# Patient Record
Sex: FEMALE | Race: WHITE | Employment: FULL TIME | URBAN - METROPOLITAN AREA
[De-identification: names, ages, dates, MRNs, and addresses within clinical notes are randomized per-mention and may not be internally consistent; named-entity substitution may affect disease eponyms.]

---

## 2017-01-30 ENCOUNTER — TELEPHONE (OUTPATIENT)
Dept: FAMILY MEDICINE | Facility: OTHER | Age: 27
End: 2017-01-30

## 2017-01-30 NOTE — TELEPHONE ENCOUNTER
Summary:    Patient is due/failing the following:   PAP, PHQ9 and PHYSICAL    Action needed:   Patient needs office visit for physical . and Patient needs to do PHQ9.    Type of outreach:    Phone, left message for patient to call back.     Questions for provider review:    None                                                                                                                                    Rekha Mac       Chart routed to Care Team .        Panel Management Review      Patient has the following on her problem list:     Depression / Dysthymia review  PHQ-9 SCORE 12/30/2015 3/14/2016 5/31/2016   Total Score - - -   Total Score 13 9 10      Patient is due for:  PHQ9      Composite cancer screening  Chart review shows that this patient is due/due soon for the following Pap Smear

## 2017-01-30 NOTE — Clinical Note
[facility]  [address] [facility] [address]  Phone: [phone]  February 1, 2017      Maryann Villarreal  [address] [place] [id number]      Dear Maryann,    We care about your health and have reviewed your health plan including your medical conditions, medications, and lab results.  Based on this review, it is recommended that you follow up regarding the following health topic(s):  -Wellness (Physical) Visit     We recommend you take the following action(s):  -Schedule a Physical appointment      Please call us at the [facility] - [phone] (or use TuneCore) to address the above recommendations.     Thank you for trusting [facility] and we appreciate the opportunity to serve you.  We look forward to supporting your healthcare needs in the future.    Healthy Regards,    Your Health Care Team  [facility]

## 2017-04-04 ENCOUNTER — OFFICE VISIT (OUTPATIENT)
Dept: URGENT CARE | Facility: RETAIL CLINIC | Age: 27
End: 2017-04-04
Payer: COMMERCIAL

## 2017-04-04 VITALS — DIASTOLIC BLOOD PRESSURE: 75 MMHG | SYSTOLIC BLOOD PRESSURE: 124 MMHG | TEMPERATURE: 97.7 F | HEART RATE: 83 BPM

## 2017-04-04 DIAGNOSIS — R30.0 DYSURIA: Primary | ICD-10-CM

## 2017-04-04 LAB
APPEARANCE UR: CLEAR
BILIRUB UR QL: NORMAL
COLOR UR: YELLOW
GLUCOSE URINE: NORMAL MG/DL
HGB UR QL: NORMAL
KETONES UR QL: NORMAL MG/DL
LEUKOCYTE ESTERASE URINE: NORMAL
NITRITE UR QL STRIP: NORMAL
PH UR STRIP: 6.5 PH (ref 5–7)
PROTEIN ALBUMIN URINE: NORMAL MG/DL
SOURCE: NORMAL
SP GR UR STRIP: 1.01 (ref 1–1.03)
UROBILINOGEN UR QL STRIP: 0.2 EU/DL (ref 0.2–1)

## 2017-04-04 PROCEDURE — 99202 OFFICE O/P NEW SF 15 MIN: CPT | Performed by: PHYSICIAN ASSISTANT

## 2017-04-04 PROCEDURE — 87086 URINE CULTURE/COLONY COUNT: CPT | Performed by: PHYSICIAN ASSISTANT

## 2017-04-04 PROCEDURE — 81002 URINALYSIS NONAUTO W/O SCOPE: CPT | Mod: QW | Performed by: PHYSICIAN ASSISTANT

## 2017-04-04 NOTE — NURSING NOTE
"Chief Complaint   Patient presents with     Dysuria     since sunday, feeling like having to urinate all the time, no fevers       Initial /75 (BP Location: Left arm)  Pulse 83  Temp 97.7  F (36.5  C) (Temporal) Estimated body mass index is 30.72 kg/(m^2) as calculated from the following:    Height as of 2/24/16: 5' 8.31\" (1.735 m).    Weight as of 5/31/16: 203 lb 14.4 oz (92.5 kg).  Medication Reconciliation: complete    "

## 2017-04-04 NOTE — PROGRESS NOTES
Chief Complaint   Patient presents with     Dysuria     since sunday, feeling like having to urinate all the time, no fevers     SUBJECTIVE:   Maryann Villarreal is a 26 year old female who  presents today for a possible UTI.   She has symptoms of urgency and frequency that have been going on for 2 days.    Symptom timing described as gradual onset and mild and moderate in severity.    This patient does have a history of 1 past urinary tract infections May 2016 treated with Cipro.  There is no history of hematuria, flank pain, fever, chills, nausea or vomiting.   Patient denies vaginal discharge, vaginal odor, vaginal itching and dyspareunia (pain in labia/pelvis)     Past Medical History:   Diagnosis Date     Restless legs syndrome (RLS)      Sprain and strain of other specified sites of knee and leg     shin splints     Current Outpatient Prescriptions   Medication Sig Dispense Refill     Phenylephrine-DM-GG-APAP (COLD & FLU SEVERE PO)        TYLENOL 325 MG OR TABS  (Patient not taking: Reported on 4/4/2017)  0     IBUPROFEN 200 MG OR CAPS  (Patient not taking: Reported on 4/4/2017)  0     Social History   Substance Use Topics     Smoking status: Never Smoker     Smokeless tobacco: Never Used     Alcohol use Yes      Comment: 3-4 times/year     Allergies   Allergen Reactions     No Known Drug Allergies      ROS:   Review of systems negative except as stated above.    OBJECTIVE:  /75 (BP Location: Left arm)  Pulse 83  Temp 97.7  F (36.5  C) (Temporal)  LMP 03/11/2017 (Exact Date)  GENERAL APPEARANCE: healthy, alert and no distress  RESP: lungs clear to auscultation - no rales, rhonchi or wheezes  CV: regular rates and rhythm, normal S1 S2, no murmur noted  ABDOMEN:  soft, no suprapubic tenderness, no HSM or masses and bowel sounds normal  BACK: No CVA tenderness    UA:  Leukocytes: negative  Nitrites: negative  Blood: negative    ASSESSMENT:    ICD-10-CM    1. Dysuria R30.0 Urine Culture Aerobic Bacterial      HCL U/A, W/O MICRO, NON AUTO     PLAN:   Patient Instructions   No sign of infection today, antibiotic is not needed.  Urine culture pending, we will call you only if culture shows bacterial growth and an antibiotic is needed.  May use over the counter Azo Pain Relief or Uristat for urinary burning but do not use for 24 hours before future urine tests.  Drink plenty of fluids. Limit caffeine and alcohol as these are bladder irritants.  May take tylenol or ibuprofen as needed for discomfort.   If you develop any vomiting, high fevers or lower back pain, these can be signs of a kidney infection and you should be seen in urgent care or in the ER.  Please follow up with primary care provider if you're not improving, symptoms are worsening or new symptoms develop.     Follow up with primary care provider with any problems, questions or concerns or if symptoms worsen or fail to improve. Patient agreed to plan and verbalized understanding.    Zulma Luna PA-C  [facility]

## 2017-04-04 NOTE — PATIENT INSTRUCTIONS
No sign of infection today, antibiotic is not needed.  Urine culture pending, we will call you only if culture shows bacterial growth and an antibiotic is needed.  May use over the counter Azo Pain Relief or Uristat for urinary burning but do not use for 24 hours before future urine tests.  Drink plenty of fluids. Limit caffeine and alcohol as these are bladder irritants.  May take tylenol or ibuprofen as needed for discomfort.   If you develop any vomiting, high fevers or lower back pain, these can be signs of a kidney infection and you should be seen in urgent care or in the ER.  Please follow up with primary care provider if you're not improving, symptoms are worsening or new symptoms develop.

## 2017-04-04 NOTE — MR AVS SNAPSHOT
After Visit Summary   4/4/2017    Maryann Villarreal    MRN: [id number]           Patient Information     Date Of Birth          1990        Visit Information        Provider Department      4/4/2017 10:40 AM Taylor Luna PA-C [facility]        Today's Diagnoses     Dysuria    -  1      Care Instructions    No sign of infection today, antibiotic is not needed.  Urine culture pending, we will call you only if culture shows bacterial growth and an antibiotic is needed.  May use over the counter Azo Pain Relief or Uristat for urinary burning but do not use for 24 hours before future urine tests.  Drink plenty of fluids. Limit caffeine and alcohol as these are bladder irritants.  May take tylenol or ibuprofen as needed for discomfort.   If you develop any vomiting, high fevers or lower back pain, these can be signs of a kidney infection and you should be seen in urgent care or in the ER.  Please follow up with primary care provider if you're not improving, symptoms are worsening or new symptoms develop.         Follow-ups after your visit        Who to contact     You can reach your care team any time of the day by calling [phone].  Notification of test results:  If you have an abnormal lab result, we will notify you by phone as soon as possible.         Additional Information About Your Visit        MyChart Information     [employer] gives you secure access to your electronic health record. If you see a primary care provider, you can also send messages to your care team and make appointments. If you have questions, please call your primary care clinic.  If you do not have a primary care provider, please call [phone] and they will assist you.        Care EveryWhere ID     This is your Care EveryWhere ID. This could be used by other organizations to access your [facility] medical records  [id number]        Your Vitals Were     Pulse Temperature Last Period             83  97.7  F (36.5  C) (Temporal) 03/11/2017 (Exact Date)          Blood Pressure from Last 3 Encounters:   04/04/17 124/75   05/31/16 108/64   03/14/16 120/64    Weight from Last 3 Encounters:   05/31/16 203 lb 14.4 oz (92.5 kg)   03/14/16 201 lb (91.2 kg)   02/24/16 197 lb 6.4 oz (89.5 kg)              We Performed the Following     HCL U/A, W/O MICRO, NON AUTO     Urine Culture Aerobic Bacterial        Primary Care Provider Office Phone # Fax #    Arleth Negro PA-C [phone]       [facility] [address] [place] [address]        Thank you!     Thank you for choosing [facility]  for your care. Our goal is always to provide you with excellent care. Hearing back from our patients is one way we can continue to improve our services. Please take a few minutes to complete the written survey that you may receive in the mail after your visit with us. Thank you!             Your Updated Medication List - Protect others around you: Learn how to safely use, store and throw away your medicines at www.disposemymeds.org.          This list is accurate as of: 4/4/17 11:09 AM.  Always use your most recent med list.                   Brand Name Dispense Instructions for use    COLD & FLU SEVERE PO          ibuprofen 200 MG capsule          TYLENOL 325 MG tablet   Generic drug:  acetaminophen

## 2017-04-05 NOTE — PROGRESS NOTES
"UC prelim \"Culture negative < 24 hours\"  No sign of bacteria  Not on antibiotic. Urine dip at visit was negative. No change in plan.  Await final  results  Judie Mccurdy PA-C  [facility]"

## 2017-04-06 LAB
BACTERIA SPEC CULT: NORMAL
MICRO REPORT STATUS: NORMAL
SPECIMEN SOURCE: NORMAL

## 2017-04-06 NOTE — PROGRESS NOTES
Urine culture remains negative with <10,000 mixed gram positive growth, likely contamination. Patient does not require antibiotic treatment at this time. No change needed. -Zulma Luna PA-C

## 2017-05-26 ENCOUNTER — HEALTH MAINTENANCE LETTER (OUTPATIENT)
Age: 27
End: 2017-05-26

## 2017-08-07 ENCOUNTER — RADIANT APPOINTMENT (OUTPATIENT)
Dept: GENERAL RADIOLOGY | Facility: OTHER | Age: 27
End: 2017-08-07
Attending: PHYSICAL MEDICINE & REHABILITATION

## 2017-08-07 ENCOUNTER — OFFICE VISIT (OUTPATIENT)
Dept: ORTHOPEDICS | Facility: OTHER | Age: 27
End: 2017-08-07

## 2017-08-07 ENCOUNTER — TELEPHONE (OUTPATIENT)
Dept: FAMILY MEDICINE | Facility: OTHER | Age: 27
End: 2017-08-07

## 2017-08-07 VITALS
DIASTOLIC BLOOD PRESSURE: 78 MMHG | WEIGHT: 210 LBS | SYSTOLIC BLOOD PRESSURE: 132 MMHG | HEIGHT: 69 IN | BODY MASS INDEX: 31.1 KG/M2

## 2017-08-07 DIAGNOSIS — S90.32XA CONTUSION OF LEFT FOOT, INITIAL ENCOUNTER: ICD-10-CM

## 2017-08-07 DIAGNOSIS — S93.602A SPRAIN OF LEFT FOOT, INITIAL ENCOUNTER: Primary | ICD-10-CM

## 2017-08-07 DIAGNOSIS — M79.672 LEFT FOOT PAIN: ICD-10-CM

## 2017-08-07 PROCEDURE — 73630 X-RAY EXAM OF FOOT: CPT | Mod: LT

## 2017-08-07 PROCEDURE — 99203 OFFICE O/P NEW LOW 30 MIN: CPT | Performed by: PHYSICAL MEDICINE & REHABILITATION

## 2017-08-07 NOTE — LETTER
Maryann Villarreal  : 1990  [address] [place] [address]  [phone] (home)             2017          To whom it may concern,      Maryann was seen in clinic today for a foot injury. She is able to return to work with the following restrictions:    -Allow 15 min breaks every 2 hours      She will be seen in follow up in 2 weeks for reevaluation. Thank you for your help in advance.             Sincerely,           Magda Abernathy MD

## 2017-08-07 NOTE — MR AVS SNAPSHOT
After Visit Summary   8/7/2017    Maryann Villarreal    MRN: [id number]           Patient Information     Date Of Birth          1990        Visit Information        Provider Department      8/7/2017 2:40 PM Magda Abernathy MD [facility]        Today's Diagnoses     Left foot pain    -  1      Care Instructions    Today's Plan of Care:  -Ibuprofen (Advil) 800mg every 8 hours for 7 days. Take with food. Following 7 day course, take as needed for pain.  -Ice 15-20 minutes for pain relief or swelling as needed  -Wear orthotics  -Wear supportive shoes  -Work restrictions: Allow 15 min breaks every 2 hours    Follow Up: 2 weeks or sooner if symptoms fail to improve or worsen. Call with any questions or concerns.               Follow-ups after your visit        Who to contact     If you have questions or need follow up information about today's clinic visit or your schedule please contact [facility] directly at [phone].  Normal or non-critical lab and imaging results will be communicated to you by [employer], letter or phone within 4 business days after the clinic has received the results. If you do not hear from us within 7 days, please contact the clinic through [employer] or phone. If you have a critical or abnormal lab result, we will notify you by phone as soon as possible.  Submit refill requests through [employer] or call your pharmacy and they will forward the refill request to us. Please allow 3 business days for your refill to be completed.          Additional Information About Your Visit        [employer] Information     [employer] gives you secure access to your electronic health record. If you see a primary care provider, you can also send messages to your care team and make appointments. If you have questions, please call your primary care clinic.  If you do not have a primary care provider, please call [phone] and they will assist you.        Care  "EveryWhere ID     This is your Care EveryWhere ID. This could be used by other organizations to access your [facility] medical records  [id number]        Your Vitals Were     Height                   5' 8.9\" (1.75 m)            Blood Pressure from Last 3 Encounters:   08/07/17 132/78   04/04/17 124/75   05/31/16 108/64    Weight from Last 3 Encounters:   05/31/16 203 lb 14.4 oz (92.5 kg)   03/14/16 201 lb (91.2 kg)   02/24/16 197 lb 6.4 oz (89.5 kg)               Primary Care Provider Office Phone # Fax #    Arleth Negro PA-C [phone]       [place] [facility] [address] [place] [address]        Equal Access to Services     [facility]: Hadii aad juany hadasho Soomaali, waaxda luqadaha, qaybta kaalmada adeegyada, waxay tammyin haylindsey [place] . So [facility] [phone].    ATENCIÓN: Si habla español, tiene a ramírez disposición servicios gratuitos de asistencia lingüística. Mery al [phone].    We comply with applicable federal civil rights laws and [place] laws. We do not discriminate on the basis of race, color, national origin, age, disability sex, sexual orientation or gender identity.            Thank you!     Thank you for choosing [facility]  for your care. Our goal is always to provide you with excellent care. Hearing back from our patients is one way we can continue to improve our services. Please take a few minutes to complete the written survey that you may receive in the mail after your visit with us. Thank you!             Your Updated Medication List - Protect others around you: Learn how to safely use, store and throw away your medicines at www.disposemymeds.org.          This list is accurate as of: 8/7/17  3:25 PM.  Always use your most recent med list.                   Brand Name Dispense Instructions for use Diagnosis    COLD & FLU SEVERE PO           ibuprofen 200 MG capsule           TYLENOL 325 MG tablet   Generic drug:  acetaminophen "

## 2017-08-07 NOTE — PATIENT INSTRUCTIONS
Today's Plan of Care:  -Ibuprofen (Advil) 800mg every 8 hours for 7 days. Take with food. Following 7 day course, take as needed for pain.  -Ice 15-20 minutes for pain relief or swelling as needed  -Wear orthotics  -Wear supportive shoes  -Work restrictions: Allow 15 min breaks every 2 hours    Follow Up: 2 weeks or sooner if symptoms fail to improve or worsen. Call with any questions or concerns.

## 2017-08-07 NOTE — PROGRESS NOTES
Sports Medicine Clinic Visit    PCP: Arleth Negro    CC: Patient presents with:  Ankle/Foot left      HPI:  Maryann Villarreal is a 26 year old female who is seen as a self referral.   She notes pain that began 8/6/17 when she was playing softball over the weekend. She hurt her foot rounding a base as well as getting hit by a ball later that same day .  Pain is located on the left foot.  She rates the pain at a  10/10 at its worst and a 7/10 currently.  Symptoms are relieved with nothing and worsened by standing, activity and walking. She endorses swelling and dull ache, some tingling into the pinky toe and denies bruising, popping, grinding, catching, locking, instability, numbness, tingling, weakness, pain in other joints and fever, chills.  Other treatment has included Aleve and Tylenol. She notes difficulty the more she walks.       Review of Systems:  Musculoskeletal: as above  Remainder of review of systems is negative including constitutional, eyes, ENT, CV, pulmonary, GI, , endocrine, skin, hematologic, and neurologic except as noted in HPI or medical history.    History reviewed. No pertinent past surgical/medical/family/social history.    Past Medical History:   Diagnosis Date     Restless legs syndrome (RLS)      Sprain and strain of other specified sites of knee and leg     shin splints     No past surgical history on file.  No family history on file.  Social History     Social History     Marital status: Single     Spouse name: N/A     Number of children: N/A     Years of education: N/A     Occupational History     Not on file.     Social History Main Topics     Smoking status: Never Smoker     Smokeless tobacco: Never Used     Alcohol use Yes      Comment: 3-4 times/year     Drug use: No     Sexual activity: No      Comment: never      Other Topics Concern     Parent/Sibling W/ Cabg, Mi Or Angioplasty Before 65f 55m? No     Social History Narrative       She works doing tree trimming  At baseline,  "she does not need assistance with ambulation      Current Outpatient Prescriptions   Medication     TYLENOL 325 MG OR TABS     IBUPROFEN 200 MG OR CAPS     Phenylephrine-DM-GG-APAP (COLD & FLU SEVERE PO)     No current facility-administered medications for this visit.      Allergies   Allergen Reactions     No Known Drug Allergies          Objective:  /78  Ht 5' 8.9\" (1.75 m)    General: Alert and in no distress    Head: Normocephalic, atraumatic  Eyes: no scleral icterus or conjunctival erythema   Oropharynx:  Mucous membranes moist  Skin: no erythema, ecchymosis, petechiae, or jaundice  CV: regular rhythm by palpation, 2+ distal pulses  Resp: normal respiratory effort without conversational dyspnea   Psych: normal mood and affect    Gait: Non-antalgic, appropriate coordination and balance   Neuro: Motor strength and sensation as noted below    Musculoskeletal:    Bilateral Foot and Ankle Exam:    Inspection:       no visible ecchymosis       no visible edema or effusion    Foot inspection:       pes planus    Palpation:  Mild tenderness over left proximal 4th metatarsal and cuneiforms.  Remainder of foot and ankle is not tender.      ROM:        Full active and passive ROM, ankle dorsiflexion, plantarflexion, inversion, eversion, great toe dorsiflexion, remainder of toes, midfoot and subtalar bilaterally    Strength:       ankle dorsiflexion 5/5 bilaterally       plantarflexion 5/5 bilaterally       inversion 5/5 bilaterally       eversion 5/5 bilaterally       great toe dorsiflexion 5/5 bilaterally       great toe plantarflexion 5/5 bilaterally    Special Tests:       neg (-) anterior drawer bilaterally       neg (-) talar tilt bilaterally       neg (-)  Srinivasan test bilaterally    Mild pain with heel raise on left     Neurovascular:       2+ peripheral pulses bilaterally       sensation grossly intact    Radiology:  X-rays ordered and independent visualization of images performed and reviewed with " patient.  No acute bony pathology appreciated.  Full radiology report to follow.      Assessment:  1. Sprain of left foot, initial encounter    2. Contusion of left foot, initial encounter        Plan:  Discussed the assessment with the patient. We discussed the following treatment options: symptom treatment, activity modification/rest, imaging and rehab. Following discussion, plan:  -Ibuprofen (Advil) 800mg every 8 hours for 7 days. Take with food. Following 7 day course, take as needed for pain.  -Ice 15-20 minutes for pain relief or swelling as needed  -Wear orthotics and supportive shoes.  Do not recommend wearing flip flops or going barefoot at this time.    -Recommend abstaining from aggravating physical activities.    -Work restrictions: Allow 15 min breaks every 2 hours.  Letter provided.     -Follow Up: 2 weeks or sooner if symptoms fail to improve or worsen. Call with any questions or concerns.       Alaina Abernathy MD, CAQ  [facility]

## 2017-08-07 NOTE — TELEPHONE ENCOUNTER
Reason for call:  Same Day Appointment  Reason for Call:  Same Day Appointment, Requested Provider:  ANYONE IN ER PH ZM      PCP: Arleth Negro    Reason for visit: Hit in the left foot with Softball    Duration of symptoms: 1 day     Have you been treated for this in the past? No    Additional comments: Pt stated that her work needs an ok for her to work       Can we leave a detailed message on this number? YES    Phone number patient can be reached at: Home number on file [phone] (home)    Best Time: anytime     Call taken on 8/7/2017 at 11:31 AM by Madonna Arechiga

## 2017-08-08 ENCOUNTER — TELEPHONE (OUTPATIENT)
Dept: FAMILY MEDICINE | Facility: OTHER | Age: 27
End: 2017-08-08

## 2017-08-08 NOTE — TELEPHONE ENCOUNTER
Reason for call:  Form  Reason for Call:  Form, our goal is to have forms completed with 72 hours, however, some forms may require a visit or additional information.    Type of letter, form or note:  physical capacity form    Who is the form from?: [employer] (if other please explain)    Where did the form come from: form was faxed in    What clinic location was the form placed at?: [facility] - [phone]    Where the form was placed: [facility]    What number is listed as a contact on the form?: [phone]       Additional comments: fax number [phone]    Call taken on 8/8/2017 at 8:21 AM by Rupa Cool

## 2017-08-10 NOTE — TELEPHONE ENCOUNTER
Pt returned our call and is giving verbal consent to release medical information to [facility] [employer].

## 2017-08-10 NOTE — TELEPHONE ENCOUNTER
Need verbal confirmation from patient to release medical information to work company, [employer].     Kary Lainez M.Ed., ATR, ATC

## 2017-08-25 ENCOUNTER — OFFICE VISIT (OUTPATIENT)
Dept: ORTHOPEDICS | Facility: OTHER | Age: 27
End: 2017-08-25

## 2017-08-25 VITALS — HEIGHT: 69 IN | BODY MASS INDEX: 31.1 KG/M2 | HEART RATE: 75 BPM | WEIGHT: 210 LBS

## 2017-08-25 DIAGNOSIS — S93.602D SPRAIN OF LEFT FOOT, SUBSEQUENT ENCOUNTER: Primary | ICD-10-CM

## 2017-08-25 PROCEDURE — 99212 OFFICE O/P EST SF 10 MIN: CPT | Performed by: PHYSICAL MEDICINE & REHABILITATION

## 2017-08-25 NOTE — NURSING NOTE
"Chief Complaint   Patient presents with     Ankle/Foot left       Initial Pulse 75  Ht 5' 8.9\" (1.75 m)  Wt 210 lb (95.3 kg)  BMI 31.1 kg/m2 Estimated body mass index is 31.1 kg/(m^2) as calculated from the following:    Height as of this encounter: 5' 8.9\" (1.75 m).    Weight as of this encounter: 210 lb (95.3 kg).  Medication Reconciliation: complete     Kary Lainez M.Ed., ATR, ATC      "

## 2017-08-25 NOTE — MR AVS SNAPSHOT
"              After Visit Summary   8/25/2017    Maryann Villarreal    MRN: [id number]           Patient Information     Date Of Birth          1990        Visit Information        Provider Department      8/25/2017 10:00 AM Magda Abernathy MD [facility]        Today's Diagnoses     Sprain of left foot, subsequent encounter    -  1      Care Instructions    Follow up: as needed            Follow-ups after your visit        Who to contact     If you have questions or need follow up information about today's clinic visit or your schedule please contact [facility] directly at [phone].  Normal or non-critical lab and imaging results will be communicated to you by MyChart, letter or phone within 4 business days after the clinic has received the results. If you do not hear from us within 7 days, please contact the clinic through [facility] or phone. If you have a critical or abnormal lab result, we will notify you by phone as soon as possible.  Submit refill requests through [facility] or call your pharmacy and they will forward the refill request to us. Please allow 3 business days for your refill to be completed.          Additional Information About Your Visit        MyChart Information     [employer] gives you secure access to your electronic health record. If you see a primary care provider, you can also send messages to your care team and make appointments. If you have questions, please call your primary care clinic.  If you do not have a primary care provider, please call [phone] and they will assist you.        Care EveryWhere ID     This is your Care EveryWhere ID. This could be used by other organizations to access your [place] medical records  [id number]        Your Vitals Were     Pulse Height BMI (Body Mass Index)             75 5' 8.9\" (1.75 m) 31.1 kg/m2          Blood Pressure from Last 3 Encounters:   08/07/17 132/78   04/04/17 124/75   05/31/16 108/64    " Weight from Last 3 Encounters:   08/25/17 210 lb (95.3 kg)   08/07/17 210 lb (95.3 kg)   05/31/16 203 lb 14.4 oz (92.5 kg)              Today, you had the following     No orders found for display       Primary Care Provider Office Phone # Fax #    Arleth Negro PA-C [phone] [address] [place] [address]        Equal Access to Services     [facility]: Hadii aad ku hadasho Soomaali, waaxda luqadaha, qaybta kaalmada adeegyada, waxay idiin hayaan adeeg kharash la'aan . [facility] [phone].    ATENCIÓN: Si habla español, tiene a ramírez disposición servicios gratuitos de asistencia lingüística. [facility] [phone].    We comply with applicable federal civil rights laws and [place] laws. We do not discriminate on the basis of race, color, national origin, age, disability sex, sexual orientation or gender identity.            Thank you!     Thank you for choosing [facility]  for your care. Our goal is always to provide you with excellent care. Hearing back from our patients is one way we can continue to improve our services. Please take a few minutes to complete the written survey that you may receive in the mail after your visit with us. Thank you!             Your Updated Medication List - Protect others around you: Learn how to safely use, store and throw away your medicines at www.disposemymeds.org.          This list is accurate as of: 8/25/17 10:25 AM.  Always use your most recent med list.                   Brand Name Dispense Instructions for use Diagnosis    COLD & FLU SEVERE PO           ibuprofen 200 MG capsule           TYLENOL 325 MG tablet   Generic drug:  acetaminophen

## 2017-08-25 NOTE — PROGRESS NOTES
Sports Medicine Clinic Visit - Interim History August 25, 2017    Initial Visit Date 8/7/17  Initial Injury Date 8/6/17    PCP: Arleth Negro Cherelle is a 26 year old female who is seen in f/u up for left foot sprain. Since last visit on 8/7/17 patient has had complete relief of her foot pain. She has not had any symptoms or issues for the past 6 days.  She was able to play softball last night without issues.  She rates the pain at a  0/10 currently.  Symptoms are relieved with time and worsened by nothing at this time.     Review of Systems  Musculoskeletal: as above  Remainder of review of systems is negative including constitutional, eyes, ENT, CV, pulmonary, GI, , endocrine, skin, hematologic, and neurologic except as noted in HPI or medical history.    History reviewed. No pertinent past surgical/medical/family/social history.    Past Medical History:   Diagnosis Date     Restless legs syndrome (RLS)      Sprain and strain of other specified sites of knee and leg     shin splints     No past surgical history on file.  No family history on file.  Social History     Social History     Marital status: Single     Spouse name: N/A     Number of children: N/A     Years of education: N/A     Occupational History     Not on file.     Social History Main Topics     Smoking status: Never Smoker     Smokeless tobacco: Never Used     Alcohol use Yes      Comment: 3-4 times/year     Drug use: No     Sexual activity: No      Comment: never      Other Topics Concern     Parent/Sibling W/ Cabg, Mi Or Angioplasty Before 65f 55m? No     Social History Narrative       She works doing tree trimming  At baseline, she does not need assistance with ambulation    Current Outpatient Prescriptions   Medication     Phenylephrine-DM-GG-APAP (COLD & FLU SEVERE PO)     TYLENOL 325 MG OR TABS     IBUPROFEN 200 MG OR CAPS     No current facility-administered medications for this visit.      Allergies   Allergen Reactions     No  "Known Drug Allergies          Objective:  Pulse 75  Ht 5' 8.9\" (1.75 m)  Wt 210 lb (95.3 kg)  BMI 31.1 kg/m2    General: Alert and in no distress    Head: Normocephalic, atraumatic  Eyes: no scleral icterus or conjunctival erythema   Oropharynx:  Mucous membranes moist  Skin: no erythema, ecchymosis, petechiae, or jaundice  CV: regular rhythm by palpation, 2+ distal pulses  Resp: normal respiratory effort without conversational dyspnea   Psych: normal mood and affect    Gait: Non-antalgic, appropriate coordination and balance   Neuro: Motor strength and sensation as noted below    Musculoskeletal:    Bilateral Foot and Ankle Exam:    Inspection:       no visible ecchymosis       no visible edema or effusion    Palpation:  No tenderness to palpation over the bilateral feet or ankles    ROM:        Full active and passive ROM, ankle dorsiflexion, plantarflexion, inversion, eversion, great toe dorsiflexion, remainder of toes, midfoot and subtalar bilaterally    Strength:       ankle dorsiflexion 5/5 bilaterally       plantarflexion 5/5 bilaterally       inversion 5/5 bilaterally       eversion 5/5 bilaterally       great toe dorsiflexion 5/5 bilaterally       great toe plantarflexion 5/5 bilaterally    Special Tests:       neg (-) anterior drawer bilaterally       neg (-) talar tilt bilaterally    No pain in the feet with single hop test    Gait:       normal    Neurovascular:       2+ peripheral pulses bilaterally       sensation grossly intact      Radiology:  Recent Results (from the past 744 hour(s))   XR Foot Left G/E 3 Views    Narrative    LEFT FOOT THREE OR MORE VIEWS   8/7/2017 2:56 PM     HISTORY: Pain in left foot.    COMPARISON: Contralateral (right) ankle x-rays dated 10/22/2007.     FINDINGS: There is no fracture, joint space loss, malalignment, or  erosion. Mild enthesopathic changes of the Achilles tendon insertion  into and at the plantar aponeurosis origin of the calcaneus.      Impression    " IMPRESSION: Mild enthesopathic changes of the calcaneus. Otherwise  negative left foot x-rays.    FRANK JEAN MD       Assessment:  1. Sprain of left foot, subsequent encounter        Plan:  Discussed the assessment with the patient and developed a plan together:  -Maryann is doing well and no longer having any pain.  She was able to play softball last night without any issues.  She is ready to return to full duties at her work.  We have filled out the paperwork requested by her work.  Please call us with questions, concerns, or returning pain.      Alaina Abernathy MD, CAQ  [facility]

## 2017-10-15 ENCOUNTER — APPOINTMENT (OUTPATIENT)
Dept: GENERAL RADIOLOGY | Facility: CLINIC | Age: 27
End: 2017-10-15
Attending: NURSE PRACTITIONER

## 2017-10-15 ENCOUNTER — HOSPITAL ENCOUNTER (EMERGENCY)
Facility: CLINIC | Age: 27
Discharge: HOME | End: 2017-10-15
Attending: NURSE PRACTITIONER | Admitting: NURSE PRACTITIONER
Payer: COMMERCIAL

## 2017-10-15 VITALS
WEIGHT: 202 LBS | TEMPERATURE: 97.5 F | DIASTOLIC BLOOD PRESSURE: 76 MMHG | BODY MASS INDEX: 29.92 KG/M2 | RESPIRATION RATE: 18 BRPM | OXYGEN SATURATION: 99 % | SYSTOLIC BLOOD PRESSURE: 135 MMHG | HEART RATE: 82 BPM

## 2017-10-15 DIAGNOSIS — M54.6 ACUTE MIDLINE THORACIC BACK PAIN: ICD-10-CM

## 2017-10-15 PROCEDURE — 25000125 ZZHC RX 250: Performed by: NURSE PRACTITIONER

## 2017-10-15 PROCEDURE — 72100 X-RAY EXAM L-S SPINE 2/3 VWS: CPT | Mod: TC

## 2017-10-15 PROCEDURE — 99284 EMERGENCY DEPT VISIT MOD MDM: CPT | Performed by: NURSE PRACTITIONER

## 2017-10-15 PROCEDURE — 25000132 ZZH RX MED GY IP 250 OP 250 PS 637: Performed by: NURSE PRACTITIONER

## 2017-10-15 PROCEDURE — 72072 X-RAY EXAM THORAC SPINE 3VWS: CPT | Mod: TC

## 2017-10-15 PROCEDURE — 99285 EMERGENCY DEPT VISIT HI MDM: CPT | Mod: Z6 | Performed by: NURSE PRACTITIONER

## 2017-10-15 RX ORDER — DIAZEPAM 5 MG
10 TABLET ORAL ONCE
Status: COMPLETED | OUTPATIENT
Start: 2017-10-15 | End: 2017-10-15

## 2017-10-15 RX ORDER — PREDNISONE 20 MG/1
40 TABLET ORAL ONCE
Status: COMPLETED | OUTPATIENT
Start: 2017-10-15 | End: 2017-10-15

## 2017-10-15 RX ORDER — PREDNISONE 20 MG/1
TABLET ORAL
Qty: 10 TABLET | Refills: 0 | Status: SHIPPED | OUTPATIENT
Start: 2017-10-15 | End: 2018-08-03

## 2017-10-15 RX ORDER — HYDROCODONE BITARTRATE AND ACETAMINOPHEN 5; 325 MG/1; MG/1
TABLET ORAL
Qty: 16 TABLET | Refills: 0 | Status: SHIPPED | OUTPATIENT
Start: 2017-10-15 | End: 2018-08-03

## 2017-10-15 RX ORDER — HYDROCODONE BITARTRATE AND ACETAMINOPHEN 5; 325 MG/1; MG/1
1 TABLET ORAL ONCE
Status: COMPLETED | OUTPATIENT
Start: 2017-10-15 | End: 2017-10-15

## 2017-10-15 RX ORDER — CYCLOBENZAPRINE HCL 10 MG
10 TABLET ORAL 3 TIMES DAILY PRN
Qty: 30 TABLET | Refills: 0 | Status: SHIPPED | OUTPATIENT
Start: 2017-10-15 | End: 2018-08-03

## 2017-10-15 RX ADMIN — HYDROCODONE BITARTRATE AND ACETAMINOPHEN 1 TABLET: 5; 325 TABLET ORAL at 14:16

## 2017-10-15 RX ADMIN — PREDNISONE 40 MG: 20 TABLET ORAL at 14:14

## 2017-10-15 RX ADMIN — DIAZEPAM 10 MG: 5 TABLET ORAL at 14:14

## 2017-10-15 ASSESSMENT — ENCOUNTER SYMPTOMS
NUMBNESS: 0
BACK PAIN: 1

## 2017-10-15 NOTE — ED NOTES
Patient was reaching for something on Friday and felt her lower back pop. SHe has been having left sided lower back pain since radiating in to her buttock.

## 2017-10-15 NOTE — ED PROVIDER NOTES
History     Chief Complaint   Patient presents with     Back Pain     The history is provided by the patient.     Maryann Villarreal is a 27 year old female who presents to the ED with complaints of back pain. Patient was leaning forward to clean her washer two days ago and felt something pop in her lower back. The pain is more localized to the left side of her lower back and is now radiating into the buttocks. The pain is described as a sharp, stabbing pain.  Applying pressure does not make the pain worse. Has been taking Ibuprofen and applying heat with mild relief. Denies numbness or tingling.       I have reviewed the Medications, Allergies, Past Medical and Surgical History, and Social History in the Epic system.    Allergies:   Allergies   Allergen Reactions     No Known Drug Allergies          No current facility-administered medications on file prior to encounter.   Current Outpatient Prescriptions on File Prior to Encounter:  IBUPROFEN 200 MG OR CAPS    Phenylephrine-DM-GG-APAP (COLD & FLU SEVERE PO)    TYLENOL 325 MG OR TABS        Patient Active Problem List   Diagnosis     Other acne     Allergic rhinitis due to other allergen     Family history of leukemia     Pain in limb     Sprain and strain of other specified sites of knee and leg     Restless legs syndrome (RLS)     GERD (gastroesophageal reflux disease)     CARDIOVASCULAR SCREENING; LDL GOAL LESS THAN 130     Major depressive disorder, recurrent episode, moderate (H)     Major depression in complete remission (H)       History reviewed. No pertinent surgical history.    Social History   Substance Use Topics     Smoking status: Never Smoker     Smokeless tobacco: Never Used     Alcohol use Yes      Comment: 3-4 times/year       Most Recent Immunizations   Administered Date(s) Administered     DPT 02/22/1994     DTAP (<7y) 04/08/1997     HEPA 02/16/2012     HIB 02/03/1993     HPV 06/13/2008     HepB 12/13/2004     Influenza (IIV3) 11/09/2007     MMR  "09/30/2004     OPV, trivalent, live 04/08/1997     TD (ADULT, 7+) 09/22/2004     TDAP Vaccine (Adacel) 03/14/2016     Varicella 04/08/1997       BMI: Estimated body mass index is 29.92 kg/(m^2) as calculated from the following:    Height as of 8/25/17: 1.75 m (5' 8.9\").    Weight as of this encounter: 91.6 kg (202 lb).      Review of Systems   Musculoskeletal: Positive for back pain.   Neurological: Negative for numbness (or tingling).   All other systems reviewed and are negative.    Physical Exam   Pulse: 82  Temp: 97.5  F (36.4  C)  Resp: 18  Weight: 91.6 kg (202 lb)  SpO2: 99 %     Physical Exam   Constitutional: She is oriented to person, place, and time. She appears well-developed and well-nourished. No distress.   HENT:   Head: Normocephalic and atraumatic.   Mouth/Throat: Oropharynx is clear and moist.   Eyes: Conjunctivae are normal. No scleral icterus.   Neck: Normal range of motion.   Cardiovascular: Normal rate.    Pulmonary/Chest: Effort normal.   Abdominal: Soft. Bowel sounds are normal.   Musculoskeletal: Normal range of motion.   Can't palpitate pain when applying pressure   Neurological: She is alert and oriented to person, place, and time. She has normal reflexes. No cranial nerve deficit.   No pain with leg raise, CMS is intact, strength is 5 out of 5   Skin: Skin is warm and dry. No rash noted. No pallor.   Psychiatric: She has a normal mood and affect. Her behavior is normal.   Nursing note and vitals reviewed.    ED Course     ED Course     Procedures      Labs Ordered and Resulted from Time of ED Arrival Up to the Time of Departure from the ED - No data to display    Results for orders placed or performed during the hospital encounter of 10/15/17 (from the past 24 hour(s))   Lumbar spine XR, 2-3 views    Narrative    LUMBAR SPINE TWO TO THREE VIEWS   10/15/2017 2:15 PM     HISTORY: Pain.    COMPARISON: None.    FINDINGS:  No acute fractures or dislocations. Alignment is anatomic.  Soft tissues " "are normal.       Impression    IMPRESSION: No fractures or dislocations.    BEENA CINTRON MD   Thoracic spine XR, 3 views    Narrative    THORACIC SPINE THREE VIEWS  10/15/2017 2:16 PM     HISTORY: Pain.    COMPARISON: None.      Impression    IMPRESSION: No acute fracture or dislocation identified. Anatomic  alignment. Included images of the lung appears clear.    BEENA CINTRON MD       Medications   diazepam (VALIUM) tablet 10 mg (10 mg Oral Given 10/15/17 1414)   HYDROcodone-acetaminophen (NORCO) 5-325 MG per tablet 1 tablet (1 tablet Oral Given 10/15/17 1416)   predniSONE (DELTASONE) tablet 40 mg (40 mg Oral Given 10/15/17 1414)       Assessments & Plan (with Medical Decision Making)  Maryann is an otherwise healthy 27-year-old female who presents to the emergency department today with complaints of mid back pain that is now radiating to her tailbone.  Patient reports that she was reaching over a washer on Friday to clean it and felt something in her mid to lower back \"pop\".  Patient has been taking ibuprofen for pain since that time without pain relief.  Patient denies any other injuries or trauma.  Patient reports this is happened one time in the past but it was not this severe.    Patient is not tender on exam, she is clearly uncomfortable with any movement.  She does not exhibit any signs concerning for cauda equina.  X-rays were obtained of thoracic and lumbar spines and are unremarkable.  Patient was given a dose of Valium, Norco, prednisone here in the emergency department and does report her symptoms have improved.    Patient's symptoms are likely largely musculoskeletal/inflammatory in nature.  I am going to send her home on a prednisone burst, she can continue ibuprofen scheduled, I will give her Norco for severe pain and Flexeril for muscle spasms.  Medication safety was discussed in detail.  Patient was encouraged to follow up with her primary care provider, Arleth Negro next Monday or Tuesday.  We " did discuss she may need physical therapy involvement to strengthen this part of her back.  Reasons to return to the emergency department were discussed, patient is agreeable to plan of care and was discharged in stable condition.       I have reviewed the nursing notes.    I have reviewed the findings, diagnosis, plan and need for follow up with the patient.    New Prescriptions    CYCLOBENZAPRINE (FLEXERIL) 10 MG TABLET    Take 1 tablet (10 mg) by mouth 3 times daily as needed for muscle spasms    HYDROCODONE-ACETAMINOPHEN (NORCO) 5-325 MG PER TABLET    1-2 tabs every 4-6 hours as needed for pain    PREDNISONE (DELTASONE) 20 MG TABLET    Take two tablets (= 40mg) each day for 5 (five) days       Final diagnoses:   Acute midline thoracic back pain - Radiating to tailbone     This document serves as a record of services personally performed by Tamika Monae AP. It was created on their behalf by Violeta Younger, a trained medical scribe. The creation of this record is based on the provider's personal observations and the statements of the patient. This document has been checked and approved by the attending provider.    Note: Chart documentation done in part with Dragon Voice Recognition software. Although reviewed after completion, some word and grammatical errors may remain.        10/15/2017   [facility] EMERGENCY DEPARTMENT     Tamika Monae, DIANN CNP  10/15/17 1503

## 2017-10-15 NOTE — ED AVS SNAPSHOT
[facility] Emergency Department    [address] [place] [address]    Phone:  [phone]    Fax:  [phone]                                       Maryann Villarreal   MRN: [id number]    Department:  [facility] Emergency Department   Date of Visit:  10/15/2017           After Visit Summary Signature Page     I have received my discharge instructions, and my questions have been answered. I have discussed any challenges I see with this plan with the nurse or doctor.    ..........................................................................................................................................  Patient/Patient Representative Signature      ..........................................................................................................................................  Patient Representative Print Name and Relationship to Patient    ..................................................               ................................................  Date                                            Time    ..........................................................................................................................................  Reviewed by Signature/Title    ...................................................              ..............................................  Date                                                            Time

## 2017-10-15 NOTE — DISCHARGE INSTRUCTIONS

## 2017-10-15 NOTE — ED AVS SNAPSHOT
[facility] Emergency Department    [address] [place] [address]    Phone:  [phone]    Fax:  [phone]                                       Maryann Villarreal   MRN: [id number]    Department:  [facility] Emergency Department   Date of Visit:  10/15/2017           Patient Information     Date Of Birth          1990        Your diagnoses for this visit were:     Acute midline thoracic back pain Radiating to tailbone       You were seen by Tamika Monae, APRN CNP.      Follow-up Information     Follow up with Arleth Negro PA-C.    Specialty:  Family Practice    Contact information:    [address] [place] [id number] [phone]          Follow up with [facility] Emergency Department.    Specialty:  EMERGENCY MEDICINE    Why:  If symptoms worsen    Contact information:    [address] [place] [id number] [phone]    Additional information:    From [address]: Exit at [address] on [place] side of [place]. Turn right on [address]. Turn left at stoplight on [address]. [facility] will be in view two blocks ahead        Discharge Instructions         General Neck and Back Pain    Both neck and back pain are usually caused by injury to the muscles or ligaments of the spine. Sometimes the disks that separate each bone of the spine may cause pain by pressing on a nearby nerve. Back and neck pain may appear after a sudden twisting or bending force (such as in a car accident), or sometimes after a simple awkward movement. In either case, muscle spasm is often present and adds to the pain.  Acute neck and back pain usually gets better in 1 to 2 weeks. Pain related to disk disease, arthritis in the spinal joints or spinal stenosis (narrowing of the spinal canal) can become chronic and last for months or years.  Back and neck pain are common problems. Most people feel better in 1 or 2 weeks, and most of the rest in 1 to 2  months. Most people can remain active.  People experience and describe pain differently.    Pain can be sharp, stabbing, shooting, aching, cramping, or burning    Movement, standing, bending, lifting, sitting, or walking may worsen the pain    Pain can be localized to one spot or area, or it can be more generalized    Pain can spread or radiate upwards, downwards, to the front, or go down your arms    Muscle spasm may occur.  Most of the time mechanical problems with the muscles or spine cause the pain. it is usually caused by an injury, whether known or not, to the muscles or ligaments. While illnesses can cause back pain, it is usually not caused by a serious illness. Pain is usually related to physical activity, whether sports, exercise, work, or normal activity. Sometimes it can occur without an identifiable cause. This can happen simply by stretching or moving wrong, without noting pain at the time. Other causes include:    Overexertion, lifting, pushing, pulling incorrectly or too aggressively.    Sudden twisting, bending or stretching from an accident (car or fall), or accidental movement.    Poor posture    Poor conditioning, lack of regular exercise    Spinal disc disease or arthritis    Stress    Pregnancy, or illness like appendicitis, bladder or kidney infection, pelvic infections   Home care    For neck pain: Use a comfortable pillow that supports the head and keeps the spine in a neutral position. The position of the head should not be tilted forward or backward.    When in bed, try to find a position of comfort. A firm mattress is best. Try lying flat on your back with pillows under your knees. You can also try lying on your side with your knees bent up towards your chest and a pillow between your knees.    At first, do not try to stretch out the sore spots. If there is a strain, it is not like the good soreness you get after exercising without an injury. In this case, stretching may make it  worse.    Avoid prolonged sitting, long car rides or travel. This puts more stress on the lower back than standing or walking.    During the first 24 to 72 hours after an injury, apply an ice pack to the painful area for 20 minutes and then remove it for 20 minutes over a period of 60 to 90 minutes or several times a day.     You can alternate ice and heat therapies. Talk with your healthcare provider about the best treatment for your back or neck pain. As a safety precaution, do not use a heating pad at bedtime. Sleeping with a heating pad can lead to skin burns or tissue damage.    Therapeutic massage can help relax the back and neck muscles without stretching them.    Be aware of safe lifting methods and do not lift anything over 15 pounds until all the pain is gone.  Medications  Talk to your healthcare provider before using medicine, especially if you have other medical problems or are taking other medicines.    You may use over-the-counter medicine to control pain, unless another pain medicine was prescribed. If you have chronic conditions like diabetes, liver or kidney disease, stomach ulcers,  gastrointestinal bleeding, or are taking blood thinner medicines.    Be careful if you are given pain medicines, narcotics, or medicine for muscle spasm. They can cause drowsiness, and can affect your coordination, reflexes, and judgment. Do not drive or operate heavy machinery.  Follow-up care  Follow up with your healthcare provider, or as advised. Physical therapy or further tests may be needed.  If X-rays were taken, you will be notified of any new findings that may affect your care.  Call 911  Seek emergency medical care if any of the following occur:    Trouble breathing    Confusion    Very drowsy or trouble awakening    Fainting or loss of consciousness    Rapid or very slow heart rate    Loss of bowel or bladder control  When to seek medical advice  Call your healthcare provider right away if any of these  occur:    Pain becomes worse or spreads into your arms or legs    Weakness, numbness or pain in one or both arms or legs    Numbness in the groin area    Difficulty walking    Fever of 100.4 F (38 C) or higher, or as directed by your healthcare provider  Date Last Reviewed: 7/1/2016 2000-2017 [employer]. [address] [place] [address], [place] [address]. All rights reserved. This information is not intended as a substitute for professional medical care. Always follow your healthcare professional's instructions.      Maryann, you can take 600 mg of ibuprofen every 6 hours for pain and inflammation.  You can take the Norco which is a narcotic for pain if pain is severe and ibuprofen is not effective.  This is a narcotic, you cannot work or drive if you take it.  You can take the Flexeril for muscle spasms, this may make you sleepy so be careful when you take this as well.  You can start the prednisone tomorrow as you received a dose here in the emergency department today.  Please follow up with Arleth as scheduled, if this continues you may need physical therapy to strengthen up this portion of your back.    24 Hour Appointment Hotline       To make an appointment at any [facility], call [phone] ([phone]). If you don't have a family doctor or clinic, we will help you find one. [facility] are conveniently located to serve the needs of you and your family.             Review of your medicines      START taking        Dose / Directions Last dose taken    cyclobenzaprine 10 MG tablet   Commonly known as:  FLEXERIL   Dose:  10 mg   Quantity:  30 tablet        Take 1 tablet (10 mg) by mouth 3 times daily as needed for muscle spasms   Refills:  0        HYDROcodone-acetaminophen 5-325 MG per tablet   Commonly known as:  NORCO   Quantity:  16 tablet        1-2 tabs every 4-6 hours as needed for pain   Refills:  0        predniSONE 20 MG tablet   Commonly known as:  DELTASONE   Quantity:  10  tablet        Take two tablets (= 40mg) each day for 5 (five) days   Refills:  0          Our records show that you are taking the medicines listed below. If these are incorrect, please call your family doctor or clinic.        Dose / Directions Last dose taken    COLD & FLU SEVERE PO        Refills:  0        ibuprofen 200 MG capsule        Refills:  0        TYLENOL 325 MG tablet   Generic drug:  acetaminophen        Refills:  0                Prescriptions were sent or printed at these locations (3 Prescriptions)                   [place] [address]   [address] [facility] [address]    Telephone:  [phone]   Fax:  [phone]   Hours:                  E-Prescribed (2 of 3)         predniSONE (DELTASONE) 20 MG tablet               cyclobenzaprine (FLEXERIL) 10 MG tablet                 Printed at Department/Unit printer (1 of 3)         HYDROcodone-acetaminophen (NORCO) 5-325 MG per tablet                Procedures and tests performed during your visit     Lumbar spine XR, 2-3 views    Thoracic spine XR, 3 views      Orders Needing Specimen Collection     None      Pending Results     No orders found from 10/13/2017 to 10/16/2017.            Pending Culture Results     No orders found from 10/13/2017 to 10/16/2017.            Pending Results Instructions     If you had any lab results that were not finalized at the time of your Discharge, you can call the ED Lab Result RN at [phone]. You will be contacted by this team for any positive Lab results or changes in treatment. The nurses are available 7 days a week from 10A to 6:30P.  You can leave a message 24 hours per day and they will return your call.        Thank you for choosing [facility]       Thank you for choosing [facility] for your care. Our goal is always to provide you with excellent care. Hearing back from our patients is one way we can continue to improve our services. Please take a few minutes to complete  the written survey that you may receive in the mail after you visit with us. Thank you!        [facility] Information     [facility] gives you secure access to your electronic health record. If you see a primary care provider, you can also send messages to your care team and make appointments. If you have questions, please call your primary care clinic.  If you do not have a primary care provider, please call [phone] and they will assist you.        Care EveryWhere ID     This is your Care EveryWhere ID. This could be used by other organizations to access your [facility] medical records  [id number]        Equal Access to Services     [facility] : Cain Henriquez, maegan phan, aarsh cordon. So [facility] [phone].    ATENCIÓN: Si habla español, tiene a ramírez disposición servicios gratuitos de asistencia lingüística. Llame al [phone].    We comply with applicable federal civil rights laws and [place] laws. We do not discriminate on the basis of race, color, national origin, age, disability, sex, sexual orientation, or gender identity.            After Visit Summary       This is your record. Keep this with you and show to your community pharmacist(s) and doctor(s) at your next visit.

## 2017-10-15 NOTE — LETTER
To Whom it may concern:      Maryann Villarreal was seen in our Emergency Department today, 10/15/17.  I expect her condition to improve over the next few days.  She may return to work on Wednesday, October 18th, 2017.    Thank you.      Sincerely,        DIANN Gabriel CNP

## 2017-10-17 ENCOUNTER — OFFICE VISIT (OUTPATIENT)
Dept: FAMILY MEDICINE | Facility: OTHER | Age: 27
End: 2017-10-17
Payer: COMMERCIAL

## 2017-10-17 ENCOUNTER — TELEPHONE (OUTPATIENT)
Dept: FAMILY MEDICINE | Facility: OTHER | Age: 27
End: 2017-10-17

## 2017-10-17 VITALS
SYSTOLIC BLOOD PRESSURE: 126 MMHG | RESPIRATION RATE: 18 BRPM | TEMPERATURE: 98.1 F | HEART RATE: 80 BPM | BODY MASS INDEX: 31.22 KG/M2 | WEIGHT: 210.8 LBS | DIASTOLIC BLOOD PRESSURE: 70 MMHG

## 2017-10-17 DIAGNOSIS — M53.3 COCCYDYNIA: ICD-10-CM

## 2017-10-17 DIAGNOSIS — M54.42 ACUTE BILATERAL LOW BACK PAIN WITH LEFT-SIDED SCIATICA: Primary | ICD-10-CM

## 2017-10-17 PROCEDURE — 99213 OFFICE O/P EST LOW 20 MIN: CPT | Performed by: PHYSICIAN ASSISTANT

## 2017-10-17 ASSESSMENT — PAIN SCALES - GENERAL: PAINLEVEL: MILD PAIN (3)

## 2017-10-17 NOTE — LETTER
[facility]  [address] [place] [id number]  Phone: [phone]    October 17, 2017        Maryann Villarreal  [address] [place] [id number]          To whom it may concern:    RE: Maryann Villarreal    Patient was seen and treated today at our clinic and missed work.  Patient may return to work October 18, 2017  with the following:  No working or lifting restrictions.  If a true functional capacity test is needed she will need to be seen by a separate facility.    Please contact me for questions or concerns.      Sincerely,        NOLA Riojas PA-C

## 2017-10-17 NOTE — TELEPHONE ENCOUNTER
Huddled with Nazario De Los Santos and stated ok to use Dr only at 440pm today. Pt contacted and will come to appt.     Cici Spaulding CMA (AAMA)

## 2017-10-17 NOTE — PATIENT INSTRUCTIONS
Low Back Pain            What is low back pain?   Low back pain is pain and stiffness in the lower back. It is one of the most common reasons people miss work.   How does it occur?   Your lower back is called your lumbar spine. It is made up of 5 bones called lumbar vertebrae. In between the vertebrae are shock absorbers called disks. Back pain can occur from an injury to the vertebrae or when a disk bulges or herniates.   Low back pain is usually caused when a ligament or muscle holding a vertebra in its proper position is strained. Vertebrae are bones that make up the spinal column through which the spinal cord passes. When these muscles or ligaments become weak or strained, the spine loses its stability, resulting in pain.   Low back pain can occur if your job involves lifting and carrying heavy objects, or if you spend a lot of time sitting or standing in one position or bending over. It can be caused by a fall or by unusually strenuous exercise. It can be brought on by the tension and stress that cause headaches in some people. It can even be brought on by violent sneezing or coughing.   People who are overweight may have low back pain because of the added stress on their back.   Back pain may occur when the muscles, joints, bones, and connective tissues of the back become inflamed as a result of an infection or an immune system problem. Arthritic disorders as well as some congenital and degenerative conditions may cause back pain.   Back pain accompanied by loss of bladder or bowel control, trouble moving your legs, or numbness or tingling in your arms or legs requires immediate medical treatment.   What are the symptoms?   Symptoms include:   pain in the back or legs   stiffness, spasm, or limited motion   The pain may be constant or may happen only in certain positions. It may get worse when you cough, sneeze, bend, twist, or strain during a bowel movement. The pain may be in only one spot or may  spread to other areas, most commonly down the buttocks and into the back of the thigh.   A low back strain typically does not produce pain past the knee into the calf or foot. Tingling or numbness in the calf or foot may indicate a herniated disk or pinched nerve.   Be sure to see your healthcare provider if:   You have weakness in your leg, especially if you cannot lift your foot, because this may be a sign of nerve damage.   You have new bowel or bladder problems as well as back pain, which may be a sign of severe injury to your spinal cord.   You have pain that gets worse despite treatment.   How is it diagnosed?   Your healthcare provider will review your medical history and examine you. You may have X-rays, an MRI, CT scan, or a bone scan.   How is it treated?   To treat this condition:   Put an ice pack, gel pack, or package of frozen vegetables, wrapped in a cloth on the area every 3 to 4 hours, for up to 20 minutes at a time for the first 2 or 3 days.   Use a heating pad or hot water bottle. Don't let the heating pad get too hot, and don't fall asleep with it. You could get a burn.   Rest in bed on a firm mattress. Often it helps to lie on your back with your knees raised on a pillow. However, some people prefer to lie on their side with their knees bent. It's best to try to stay active, so try not to rest in bed longer than 1 to 2 days.   Take muscle relaxants as recommended by your healthcare provider.   Take an anti-inflammatory such as ibuprofen, or other medicine as directed by your provider. Nonsteroidal anti-inflammatory medicines (NSAIDs) may cause stomach bleeding and other problems. These risks increase with age. Read the label and take as directed. Unless recommended by your healthcare provider, do not take for more than 10 days.   Get a back massage by a trained person.   Wear a belt or corset to support your back.   Do the exercises recommended by your provider. Your provider may also prescribe  physical therapy.   Talk with a counselor, if your back pain is related to tension caused by emotional problems.   When the pain is gone, ask your healthcare provider about starting an exercise program such as the following:   Exercise moderately every day, using stretching and warm-up exercises suggested by your provider or physical therapist.   Exercise vigorously for about 30 minutes 3 times a week by walking, swimming, using a stationary bicycle, or doing low-impact aerobics.   Exercising regularly will not only help your back, it will also help keep you healthier overall.   How long will the effects last?   The effects of back pain last as long as the cause exists or until your body recovers from the strain, usually a day or two but sometimes weeks.   How can I take care of myself?   In addition to the treatment described above, keep in mind these suggestions:   Practice good posture. Stand with your head up, shoulders straight, chest forward, weight balanced evenly on both feet, and pelvis tucked in.   Lose weight if you are overweight   Keep your core muscles strong. These are your abdominal and back muscles.   Sleep without a pillow under your head.   Pain is the best way to  the pace you should set in increasing your activity and exercise. Minor discomfort, stiffness, soreness, and mild aches need not interfere with activity. However, limit your activities temporarily if:   Your symptoms return.   The pain increases when you are more active.   The pain increases within 24 hours after a new or higher level of activity.   When can I return to my normal activities?   Everyone recovers from an injury at a different rate. Return to your activities depends on how soon your back recovers, not by how many days or weeks it has been since your injury has occurred. In general, the longer you have symptoms before you start treatment, the longer it will take to get better. The goal is to return to your normal  activities as soon as is safely possible. If you return too soon you may worsen your injury.   It is important that you have fully recovered from your low back pain before you return to any strenuous activity. You must be able to have the same range of motion that you had before your injury. You must be able to walk and twist without pain.   What can I do to help prevent low back pain?   You can reduce the strain on your back by doing the following:   Don't push with your arms when you move a heavy object. Turn around and push backwards so the strain is taken by your legs.   Whenever you sit, sit in a straight-backed chair and hold your spine against the back of the chair.   Bend your knees and hips and keep your back straight when you lift a heavy object.   Avoid lifting heavy objects higher than your waist.   Hold packages you carry close to your body, with your arms bent.   Use a footrest for one foot when you stand or sit in one spot for a long time. This keeps your back straight.   Bend your knees when you bend over.   Sit close to the pedals when you drive and use your seat belt and a hard backrest or pillow.   Lie on your side with your knees bent when you sleep or rest. It may help to put a pillow between your knees.   Put a pillow under your knees when you sleep on your back.   Raise the foot of the bed 8 inches to discourage sleeping on your stomach unless you have other problems that require that you keep your head elevated.   To rest your back, hold each of these positions for 5?minutes or longer:   Lie on your back, bend your knees, and put pillows under your knees.   Lie on your back on the floor with a pillow under your neck. Bend your knees to a 90-degree angle, and put your lower legs and feet on a chair.   Lie on your back, bend your knees, and bring one knee up to your chest and hold it there. Repeat with the other knee, then bring both knees to your chest. When holding your knee to your chest,  grab your thigh rather than your lower leg to avoid over flexing your knee.     Published by [employer].  This content is reviewed periodically and is subject to change as new health information becomes available. The information is intended to inform and educate and is not a replacement for medical evaluation, advice, diagnosis or treatment by a healthcare professional.   Developed by Felicitas Stockton RN, [place], and [facility].   ? 2010 [facility] and/or its affiliates. All Rights Reserved.           Low Back Pain Exercise          Standing hamstring stretch: Put the heel of one leg on a stool about 15 inches high. Keep your leg straight. Lean forward, bending at the hips until you feel a mild stretch in the back of your thigh. Make sure you do not roll your shoulders or bend at the waist when doing this. You want to stretch your leg, not your lower back. Hold the stretch for 15 to 30 seconds. Repeat with each leg 3 times.   Cat and camel: Get down on your hands and knees. Let your stomach sag, allowing your back to curve downward. Hold this position for 5 seconds. Then arch your back and hold for 5 seconds. Do 3 sets of 10.   Quadruped arm and leg raise: Get down on your hands and knees. Pull in your belly button and tighten your abdominal muscles to stiffen your spine. While keeping your abdominals tight, raise one arm and the opposite leg away from you. Hold this position for 5 seconds. Lower your arm and leg slowly and change sides. Do this 10 times on each side.   Pelvic tilt: Lie on your back with your knees bent and your feet flat on the floor. Tighten your abdominal muscles and push your lower back into the floor. Hold this position for 5 seconds, then relax. Do 3 sets of 10.   Partial curl: Lie on your back with your knees bent and your feet flat on the floor. Tighten your stomach muscles. Tuck your chin to your chest. With your hands stretched out in front of you, curl your upper body forward until your  shoulders clear the floor. Hold this position for 3 seconds. Don't hold your breath. It helps to breathe out as you lift your shoulders up. Relax back to the floor. Repeat 10 times. Build to 3 sets of 10. To challenge yourself, clasp your hands behind your head and keep your elbows out to the side.   Gluteal stretch: Lie on your back with both knees bent. Rest the ankle of one leg over the knee of your other leg. Grasp the thigh of the bottom leg and pull toward your chest. You will feel a stretch along the buttocks and possibly along the outside of your hip. Hold the stretch for 15 to 30 seconds. Repeat 3 times with each leg.   Extension exercise:   0. Lie face down on the floor for 5 minutes. If this hurts too much, lie face down with a pillow under your stomach. This should relieve your leg or back pain. When you can lie on your stomach for 5 minutes without a pillow, you can continue with Part B of this exercise.   0. After lying on your stomach for 5 minutes, prop yourself up on your elbows for another 5 minutes. If you can do this without having more leg or buttock pain, you can start doing part C of this exercise.   0. Lie on your stomach with your hands under your shoulders. Then press down on your hands and extend your elbows while keeping your hips flat on the floor. Hold for 1 second and lower yourself to the floor. Do 3 to 5 sets of 10 repetitions. Rest for 1 minute between sets. You should have no pain in your legs when you do this, but it is normal to feel some pain in your lower back.   Do this exercise several times a day.   Side plank: Lie on your side with your legs, hips, and shoulders in a straight line. Prop yourself up onto your forearm so your elbow is directly under your shoulder. Lift your hips off the floor and balance on your forearm and the outside of your foot. Try to hold this position for 15 seconds, then slowly lower your hip to the ground. Switch sides and repeat. Work up to holding  for 1 minute or longer. This exercise can be made easier by starting with your knees and hips flexed toward your chest.   Published by [employer].  This content is reviewed periodically and is subject to change as new health information becomes available. The information is intended to inform and educate and is not a replacement for medical evaluation, advice, diagnosis or treatment by a healthcare professional.   Written by Mirtha Prakash, MS, PT, and Felicitas Jaimes PT, [facility], [facility] [place], for [facility]   ? 2010 [facility] and/or its affiliates. All Rights Reserved.         Copyright   [employer] 2011

## 2017-10-17 NOTE — PROGRESS NOTES
SUBJECTIVE:                                                    Maryann Villarreal is a 27 year old female who presents to clinic today for the following health issues:    HPI    Patient works as a  for [employer] and presents today with what appears to be a functional capacity test form to be completed. We do not do functional capacity test within this clinic and I inform her as much. Her pain is completely resolved at this point in time. I advised her that after we do physical exam we will consider whether or not she needs to be restricted to any duties at work.    Problem list and histories reviewed & adjusted, as indicated.  Additional history: as documented    Patient Active Problem List   Diagnosis     Other acne     Allergic rhinitis due to other allergen     Family history of leukemia     Pain in limb     Sprain and strain of other specified sites of knee and leg     Restless legs syndrome (RLS)     GERD (gastroesophageal reflux disease)     CARDIOVASCULAR SCREENING; LDL GOAL LESS THAN 130     Major depressive disorder, recurrent episode, moderate (H)     Major depression in complete remission (H)     Acute bilateral low back pain with left-sided sciatica     History reviewed. No pertinent surgical history.    Social History   Substance Use Topics     Smoking status: Never Smoker     Smokeless tobacco: Never Used     Alcohol use Yes      Comment: 3-4 times/year     History reviewed. No pertinent family history.          ROS:  Constitutional, HEENT, cardiovascular, pulmonary, gi and gu systems are negative, except as otherwise noted.      OBJECTIVE:   /70 (Cuff Size: Adult Regular)  Pulse 80  Temp 98.1  F (36.7  C) (Temporal)  Resp 18  Wt 210 lb 12.8 oz (95.6 kg)  LMP 10/13/2017  BMI 31.22 kg/m2  Body mass index is 31.22 kg/(m^2).  GENERAL: healthy, alert and no distress  RESP: lungs clear to auscultation - no rales, rhonchi or wheezes  CV: regular rate and rhythm, normal S1 S2, no S3 or  S4, no murmur, click or rub, no peripheral edema and peripheral pulses strong  MS: no gross musculoskeletal defects noted, no edema  SKIN: no suspicious lesions or rashes  NEURO: Normal strength and tone, mentation intact and speech normal  Comprehensive back pain exam:  No tenderness, Range of motion not limited by pain, Lower extremity strength functional and equal on both sides, Lower extremity reflexes within normal limits bilaterally, Lower extremity sensation normal and equal on both sides and Straight leg raise negative bilaterally  PSYCH: mentation appears normal, affect normal/bright    Diagnostic Test Results:  Results for orders placed or performed during the hospital encounter of 10/15/17   Thoracic spine XR, 3 views    Narrative    THORACIC SPINE THREE VIEWS  10/15/2017 2:16 PM     HISTORY: Pain.    COMPARISON: None.      Impression    IMPRESSION: No acute fracture or dislocation identified. Anatomic  alignment. Included images of the lung appears clear.    BEENA CINTRON MD   Lumbar spine XR, 2-3 views    Narrative    LUMBAR SPINE TWO TO THREE VIEWS   10/15/2017 2:15 PM     HISTORY: Pain.    COMPARISON: None.    FINDINGS:  No acute fractures or dislocations. Alignment is anatomic.  Soft tissues are normal.       Impression    IMPRESSION: No fractures or dislocations.    BEENA CINTRON MD       ASSESSMENT/PLAN:     1. Acute bilateral low back pain with left-sided sciatica  2. Coccydynia  At this point in time she would appear to be completely resolved from her recent low back pain episode. We note that this did happen at home. We advised her to begin a stretching program and see physical therapy for consultation as regards to her poor flexibility that we note on physical exam. Once again we do not do functional capacity test here at the clinic as we do not have the equipment or the facility to be able to provide them with any type of accuracy. We have names and numbers of people that can do this for the  company if they insist on having her have this done. She was released for full duty without restrictions at this point in time.  - PHYSICAL THERAPY REFERRAL  Follow-up with physical therapy is strongly advised. We'll forward a copy of this note to her PCP  Nazario Evans PA-C  [facility]

## 2017-10-17 NOTE — TELEPHONE ENCOUNTER
[facility] is unable to fit her in today and recommends that we see if another provider will see her at their next opening. Pt states she needs a letter to return to work with any restrictions listed on this. The one she got from the ED states she can go back to work but does not have restrictions and does not specifically state she can go back with no restrictions. Will huddle with other providers in clinic to see if ok to place in same day or a work in.    Cici Spaulding CMA ([facility])

## 2017-10-17 NOTE — NURSING NOTE
"Chief Complaint   Patient presents with     Musculoskeletal Problem       Initial /70 (Cuff Size: Adult Regular)  Pulse 80  Temp 98.1  F (36.7  C) (Temporal)  Resp 18  Wt 210 lb 12.8 oz (95.6 kg)  LMP 10/13/2017  BMI 31.22 kg/m2 Estimated body mass index is 31.22 kg/(m^2) as calculated from the following:    Height as of 8/25/17: 5' 8.9\" (1.75 m).    Weight as of this encounter: 210 lb 12.8 oz (95.6 kg).  Medication Reconciliation: complete   Jil Alonzo CMA ([facility])    "

## 2017-10-17 NOTE — MR AVS SNAPSHOT
After Visit Summary   10/17/2017    Maryann Villarreal    MRN: [id number]           Patient Information     Date Of Birth          1990        Visit Information        Provider Department      10/17/2017 4:40 PM Nazario De Los Santos PA-C [facility]        Today's Diagnoses     Acute bilateral low back pain with left-sided sciatica    -  1    Coccydynia          Care Instructions             Low Back Pain            What is low back pain?   Low back pain is pain and stiffness in the lower back. It is one of the most common reasons people miss work.   How does it occur?   Your lower back is called your lumbar spine. It is made up of 5 bones called lumbar vertebrae. In between the vertebrae are shock absorbers called disks. Back pain can occur from an injury to the vertebrae or when a disk bulges or herniates.   Low back pain is usually caused when a ligament or muscle holding a vertebra in its proper position is strained. Vertebrae are bones that make up the spinal column through which the spinal cord passes. When these muscles or ligaments become weak or strained, the spine loses its stability, resulting in pain.   Low back pain can occur if your job involves lifting and carrying heavy objects, or if you spend a lot of time sitting or standing in one position or bending over. It can be caused by a fall or by unusually strenuous exercise. It can be brought on by the tension and stress that cause headaches in some people. It can even be brought on by violent sneezing or coughing.   People who are overweight may have low back pain because of the added stress on their back.   Back pain may occur when the muscles, joints, bones, and connective tissues of the back become inflamed as a result of an infection or an immune system problem. Arthritic disorders as well as some congenital and degenerative conditions may cause back pain.   Back pain accompanied by loss of bladder or bowel control, trouble  moving your legs, or numbness or tingling in your arms or legs requires immediate medical treatment.   What are the symptoms?   Symptoms include:   pain in the back or legs   stiffness, spasm, or limited motion   The pain may be constant or may happen only in certain positions. It may get worse when you cough, sneeze, bend, twist, or strain during a bowel movement. The pain may be in only one spot or may spread to other areas, most commonly down the buttocks and into the back of the thigh.   A low back strain typically does not produce pain past the knee into the calf or foot. Tingling or numbness in the calf or foot may indicate a herniated disk or pinched nerve.   Be sure to see your healthcare provider if:   You have weakness in your leg, especially if you cannot lift your foot, because this may be a sign of nerve damage.   You have new bowel or bladder problems as well as back pain, which may be a sign of severe injury to your spinal cord.   You have pain that gets worse despite treatment.   How is it diagnosed?   Your healthcare provider will review your medical history and examine you. You may have X-rays, an MRI, CT scan, or a bone scan.   How is it treated?   To treat this condition:   Put an ice pack, gel pack, or package of frozen vegetables, wrapped in a cloth on the area every 3 to 4 hours, for up to 20 minutes at a time for the first 2 or 3 days.   Use a heating pad or hot water bottle. Don't let the heating pad get too hot, and don't fall asleep with it. You could get a burn.   Rest in bed on a firm mattress. Often it helps to lie on your back with your knees raised on a pillow. However, some people prefer to lie on their side with their knees bent. It's best to try to stay active, so try not to rest in bed longer than 1 to 2 days.   Take muscle relaxants as recommended by your healthcare provider.   Take an anti-inflammatory such as ibuprofen, or other medicine as directed by your provider.  Nonsteroidal anti-inflammatory medicines (NSAIDs) may cause stomach bleeding and other problems. These risks increase with age. Read the label and take as directed. Unless recommended by your healthcare provider, do not take for more than 10 days.   Get a back massage by a trained person.   Wear a belt or corset to support your back.   Do the exercises recommended by your provider. Your provider may also prescribe physical therapy.   Talk with a counselor, if your back pain is related to tension caused by emotional problems.   When the pain is gone, ask your healthcare provider about starting an exercise program such as the following:   Exercise moderately every day, using stretching and warm-up exercises suggested by your provider or physical therapist.   Exercise vigorously for about 30 minutes 3 times a week by walking, swimming, using a stationary bicycle, or doing low-impact aerobics.   Exercising regularly will not only help your back, it will also help keep you healthier overall.   How long will the effects last?   The effects of back pain last as long as the cause exists or until your body recovers from the strain, usually a day or two but sometimes weeks.   How can I take care of myself?   In addition to the treatment described above, keep in mind these suggestions:   Practice good posture. Stand with your head up, shoulders straight, chest forward, weight balanced evenly on both feet, and pelvis tucked in.   Lose weight if you are overweight   Keep your core muscles strong. These are your abdominal and back muscles.   Sleep without a pillow under your head.   Pain is the best way to  the pace you should set in increasing your activity and exercise. Minor discomfort, stiffness, soreness, and mild aches need not interfere with activity. However, limit your activities temporarily if:   Your symptoms return.   The pain increases when you are more active.   The pain increases within 24 hours after a new or  higher level of activity.   When can I return to my normal activities?   Everyone recovers from an injury at a different rate. Return to your activities depends on how soon your back recovers, not by how many days or weeks it has been since your injury has occurred. In general, the longer you have symptoms before you start treatment, the longer it will take to get better. The goal is to return to your normal activities as soon as is safely possible. If you return too soon you may worsen your injury.   It is important that you have fully recovered from your low back pain before you return to any strenuous activity. You must be able to have the same range of motion that you had before your injury. You must be able to walk and twist without pain.   What can I do to help prevent low back pain?   You can reduce the strain on your back by doing the following:   Don't push with your arms when you move a heavy object. Turn around and push backwards so the strain is taken by your legs.   Whenever you sit, sit in a straight-backed chair and hold your spine against the back of the chair.   Bend your knees and hips and keep your back straight when you lift a heavy object.   Avoid lifting heavy objects higher than your waist.   Hold packages you carry close to your body, with your arms bent.   Use a footrest for one foot when you stand or sit in one spot for a long time. This keeps your back straight.   Bend your knees when you bend over.   Sit close to the pedals when you drive and use your seat belt and a hard backrest or pillow.   Lie on your side with your knees bent when you sleep or rest. It may help to put a pillow between your knees.   Put a pillow under your knees when you sleep on your back.   Raise the foot of the bed 8 inches to discourage sleeping on your stomach unless you have other problems that require that you keep your head elevated.   To rest your back, hold each of these positions for 5?minutes or longer:    Lie on your back, bend your knees, and put pillows under your knees.   Lie on your back on the floor with a pillow under your neck. Bend your knees to a 90-degree angle, and put your lower legs and feet on a chair.   Lie on your back, bend your knees, and bring one knee up to your chest and hold it there. Repeat with the other knee, then bring both knees to your chest. When holding your knee to your chest, grab your thigh rather than your lower leg to avoid over flexing your knee.     Published by [employer].  This content is reviewed periodically and is subject to change as new health information becomes available. The information is intended to inform and educate and is not a replacement for medical evaluation, advice, diagnosis or treatment by a healthcare professional.   Developed by Felicitas Stocktno RN, [place], and [employer].   ? 2010 [facility] and/or its affiliates. All Rights Reserved.           Low Back Pain Exercise          Standing hamstring stretch: Put the heel of one leg on a stool about 15 inches high. Keep your leg straight. Lean forward, bending at the hips until you feel a mild stretch in the back of your thigh. Make sure you do not roll your shoulders or bend at the waist when doing this. You want to stretch your leg, not your lower back. Hold the stretch for 15 to 30 seconds. Repeat with each leg 3 times.   Cat and camel: Get down on your hands and knees. Let your stomach sag, allowing your back to curve downward. Hold this position for 5 seconds. Then arch your back and hold for 5 seconds. Do 3 sets of 10.   Quadruped arm and leg raise: Get down on your hands and knees. Pull in your belly button and tighten your abdominal muscles to stiffen your spine. While keeping your abdominals tight, raise one arm and the opposite leg away from you. Hold this position for 5 seconds. Lower your arm and leg slowly and change sides. Do this 10 times on each side.   Pelvic tilt: Lie on your back with your  knees bent and your feet flat on the floor. Tighten your abdominal muscles and push your lower back into the floor. Hold this position for 5 seconds, then relax. Do 3 sets of 10.   Partial curl: Lie on your back with your knees bent and your feet flat on the floor. Tighten your stomach muscles. Tuck your chin to your chest. With your hands stretched out in front of you, curl your upper body forward until your shoulders clear the floor. Hold this position for 3 seconds. Don't hold your breath. It helps to breathe out as you lift your shoulders up. Relax back to the floor. Repeat 10 times. Build to 3 sets of 10. To challenge yourself, clasp your hands behind your head and keep your elbows out to the side.   Gluteal stretch: Lie on your back with both knees bent. Rest the ankle of one leg over the knee of your other leg. Grasp the thigh of the bottom leg and pull toward your chest. You will feel a stretch along the buttocks and possibly along the outside of your hip. Hold the stretch for 15 to 30 seconds. Repeat 3 times with each leg.   Extension exercise:   0. Lie face down on the floor for 5 minutes. If this hurts too much, lie face down with a pillow under your stomach. This should relieve your leg or back pain. When you can lie on your stomach for 5 minutes without a pillow, you can continue with Part B of this exercise.   0. After lying on your stomach for 5 minutes, prop yourself up on your elbows for another 5 minutes. If you can do this without having more leg or buttock pain, you can start doing part C of this exercise.   0. Lie on your stomach with your hands under your shoulders. Then press down on your hands and extend your elbows while keeping your hips flat on the floor. Hold for 1 second and lower yourself to the floor. Do 3 to 5 sets of 10 repetitions. Rest for 1 minute between sets. You should have no pain in your legs when you do this, but it is normal to feel some pain in your lower back.   Do this  exercise several times a day.   Side plank: Lie on your side with your legs, hips, and shoulders in a straight line. Prop yourself up onto your forearm so your elbow is directly under your shoulder. Lift your hips off the floor and balance on your forearm and the outside of your foot. Try to hold this position for 15 seconds, then slowly lower your hip to the ground. Switch sides and repeat. Work up to holding for 1 minute or longer. This exercise can be made easier by starting with your knees and hips flexed toward your chest.   Published by [employer].  This content is reviewed periodically and is subject to change as new health information becomes available. The information is intended to inform and educate and is not a replacement for medical evaluation, advice, diagnosis or treatment by a healthcare professional.   Written by Mirtha Prakash MS, PT, and Felicitas Jaimes PT, [facility], [facility], for [facility]   ? 2010 [facility] and/or its affiliates. All Rights Reserved.         Copyright   [employer] 2011                      Follow-ups after your visit        Additional Services     PHYSICAL THERAPY REFERRAL       *This therapy referral will be filtered to a centralized scheduling office at [facility] and the patient will receive a call to schedule an appointment at a [facility] location most convenient for them. *     [facility] provides Physical Therapy evaluation and treatment and many specialty services across the [facility] system.  If requesting a specialty program, please choose from the list below.    If you have not heard from the scheduling office within 2 business days, please call [phone] for all locations, with the exception of [place], please call [phone].  Treatment: Evaluation & Treatment  Special Instructions/Modalities: low back pain  Special Programs: as needed for low back pain.    Please be aware that coverage of these services is  "subject to the terms and limitations of your health insurance plan.  Call member services at your health plan with any benefit or coverage questions.      **Note to Provider:  If you are referring outside of [facility] for the therapy appointment, please list the name of the location in the \"special instructions\" above, print the referral and give to the patient to schedule the appointment.                  Who to contact     If you have questions or need follow up information about today's clinic visit or your schedule please contact [facility] directly at [phone].  Normal or non-critical lab and imaging results will be communicated to you by [employer], letter or phone within 4 business days after the clinic has received the results. If you do not hear from us within 7 days, please contact the clinic through [employer] or phone. If you have a critical or abnormal lab result, we will notify you by phone as soon as possible.  Submit refill requests through [employer] or call your pharmacy and they will forward the refill request to us. Please allow 3 business days for your refill to be completed.          Additional Information About Your Visit        [employer] Information     [employer] gives you secure access to your electronic health record. If you see a primary care provider, you can also send messages to your care team and make appointments. If you have questions, please call your primary care clinic.  If you do not have a primary care provider, please call [phone] and they will assist you.        Care EveryWhere ID     This is your Care EveryWhere ID. This could be used by other organizations to access your [facility] medical records  [id number]        Your Vitals Were     Pulse Temperature Respirations Last Period BMI (Body Mass Index)       80 98.1  F (36.7  C) (Temporal) 18 10/13/2017 31.22 kg/m2        Blood Pressure from Last 3 Encounters:   10/17/17 126/70   10/15/17 135/76   08/07/17 132/78    " Weight from Last 3 Encounters:   10/17/17 210 lb 12.8 oz (95.6 kg)   10/15/17 202 lb (91.6 kg)   08/25/17 210 lb (95.3 kg)              We Performed the Following     PHYSICAL THERAPY REFERRAL        Primary Care Provider Office Phone # Fax #    Arleth Negro PA-C [phone]       [address] [place] [address]        Equal Access to Services     [facility] TRAMAINE : Hadii aad ku hadasho Soomaali, waaxda luqadaha, qaybta kaalmada adeegyada, waxay idiin hayaan adeeg kharash latavo . So [facility] [phone].    ATENCIÓN: Si evelyn salazar, tiene a ramírez disposición servicios gratuitos de asistencia lingüística. Llame al [phone].    We comply with applicable federal civil rights laws and [place] laws. We do not discriminate on the basis of race, color, national origin, age, disability, sex, sexual orientation, or gender identity.            Thank you!     Thank you for choosing [facility]  for your care. Our goal is always to provide you with excellent care. Hearing back from our patients is one way we can continue to improve our services. Please take a few minutes to complete the written survey that you may receive in the mail after your visit with us. Thank you!             Your Updated Medication List - Protect others around you: Learn how to safely use, store and throw away your medicines at www.disposemymeds.org.          This list is accurate as of: 10/17/17  4:49 PM.  Always use your most recent med list.                   Brand Name Dispense Instructions for use Diagnosis    COLD & FLU SEVERE PO           cyclobenzaprine 10 MG tablet    FLEXERIL    30 tablet    Take 1 tablet (10 mg) by mouth 3 times daily as needed for muscle spasms        HYDROcodone-acetaminophen 5-325 MG per tablet    NORCO    16 tablet    1-2 tabs every 4-6 hours as needed for pain        ibuprofen 200 MG capsule           predniSONE 20 MG tablet    DELTASONE    10 tablet    Take two tablets (= 40mg) each  day for 5 (five) days        TYLENOL 325 MG tablet   Generic drug:  acetaminophen

## 2017-10-17 NOTE — TELEPHONE ENCOUNTER
Reason for Call:  Same Day Appointment, Requested Provider:  VIRI Arora    PCP: Arleth Negro    Reason for visit: back pain and needs return to work form filled out, wants today or she cannot work the rest of this week     Duration of symptoms: ongoing    Have you been treated for this in the past? Yes    Additional comments: None    Can we leave a detailed message on this number? YES    Phone number patient can be reached at: Home number on file [phone] (home)    Best Time: any asap    Call taken on 10/17/2017 at 12:35 PM by Alaina Shah

## 2018-02-22 ENCOUNTER — TELEPHONE (OUTPATIENT)
Dept: FAMILY MEDICINE | Facility: OTHER | Age: 28
End: 2018-02-22

## 2018-02-22 NOTE — TELEPHONE ENCOUNTER
Summary:    Patient is due/failing the following:   PAP and PHQ9    Action needed:   Patient needs office visit for PAP. and Patient needs to do PHQ9.    Type of outreach:    Phone, left message for patient to call back.     Questions for provider review:    None                                                                                                                                    Rekha Nilsoncassandra     Chart routed to Care Team .          Panel Management Review      Patient has the following on her problem list:     Depression / Dysthymia review    Measure:  Needs PHQ-9 score of 4 or less during index window.  Administer PHQ-9 and if score is 5 or more, send encounter to provider for next steps.      PHQ-9 SCORE 12/30/2015 3/14/2016 5/31/2016   Total Score - - -   Total Score 13 9 10       If PHQ-9 recheck is 5 or more, route to provider for next steps.    Patient is due for:  PHQ9      Composite cancer screening  Chart review shows that this patient is due/due soon for the following Pap Smear

## 2018-02-22 NOTE — LETTER
[facility]  [address] [place] [id number]  Phone: [phone]  March 1, 2018      Maryann Villarreal  [address] [address] [place] [id number]      Dear Maryann,    We care about your health and have reviewed your health plan including your medical conditions, medications, and lab results.  Based on this review, it is recommended that you follow up regarding the following health topic(s):  -Depression  -Cervical Cancer Screening    We recommend you take the following action(s):  -schedule a PAP SMEAR EXAM which is due.  Please disregard this reminder if you have had this exam elsewhere within the last year.  It would be helpful for us to have a copy of your recent pap smear report to update your records.  -Complete and return the attached PHQ-9 Form.  If your total score is greater than 9, please schedule a followup appointment.  If you answer Yes to question 9, call your clinic between the hours of 8 to 5.  You may also call the Suicide Hotline at [phone]-FTFO ([phone]) any time.     Please call us at the [facility] - [phone] (or use [employer]) to address the above recommendations.     Thank you for trusting [facility] and we appreciate the opportunity to serve you.  We look forward to supporting your healthcare needs in the future.    Healthy Regards,    Your Health Care Team  [facility]

## 2018-08-03 ENCOUNTER — OFFICE VISIT (OUTPATIENT)
Dept: ORTHOPEDICS | Facility: OTHER | Age: 28
End: 2018-08-03
Payer: COMMERCIAL

## 2018-08-03 VITALS
HEIGHT: 67 IN | BODY MASS INDEX: 31.08 KG/M2 | WEIGHT: 198 LBS | DIASTOLIC BLOOD PRESSURE: 75 MMHG | SYSTOLIC BLOOD PRESSURE: 118 MMHG

## 2018-08-03 DIAGNOSIS — G89.29 CHRONIC PERISCAPULAR PAIN ON LEFT SIDE: Primary | ICD-10-CM

## 2018-08-03 DIAGNOSIS — M25.512 CHRONIC PERISCAPULAR PAIN ON LEFT SIDE: Primary | ICD-10-CM

## 2018-08-03 PROCEDURE — 99213 OFFICE O/P EST LOW 20 MIN: CPT | Performed by: PHYSICAL MEDICINE & REHABILITATION

## 2018-08-03 NOTE — LETTER
8/3/2018         RE: Maryann Villarreal  [address] [facility] [address]        Dear Colleague,    Thank you for referring your patient, Maryann Villarreal, to the [facility]. Please see a copy of my visit note below.    Sports Medicine Clinic Visit    PCP: Arleth Negro    CC: Patient presents with:  Left Shoulder - Pain      HPI:  Maryann Villarreal is a 27 year old female who is seen as a self referral.   She notes left shoulder pain that began ~ 1.5 years ago with insidious onset. She has had pain for some time with throwing and the shoulder is now starting to bother her with ADLs, pulling a chainsaw at work. She denies any injury to the shoulder. She notes pain in the posterior shoulder. She rates the pain at a 9/10 at its worst and a 2/10 currently.  Symptoms are relieved with rest. Symptoms are worsened by pulling, throwing, and over use. She endorses numbness and tingling in the posterior arm and weakness.   She denies swelling, bruising, popping, grinding, catching, locking and instability.  Other treatment has included cold compresses and ibuprofen.       Review of Systems:  Musculoskeletal: as above  Remainder of review of systems is negative including constitutional, eyes, ENT, CV, pulmonary, GI, , endocrine, skin, hematologic, and neurologic except as noted in HPI or medical history.    History reviewed. No pertinent past surgical/medical/family/social history other than as mentioned in HPI.    Patient Active Problem List   Diagnosis     Other acne     Allergic rhinitis due to other allergen     Family history of leukemia     Pain in limb     Sprain and strain of other specified sites of knee and leg     Restless legs syndrome (RLS)     GERD (gastroesophageal reflux disease)     CARDIOVASCULAR SCREENING; LDL GOAL LESS THAN 130     Major depressive disorder, recurrent episode, moderate (H)     Major depression in complete remission (H)     Acute bilateral low back pain with  "left-sided sciatica     Past Medical History:   Diagnosis Date     Restless legs syndrome (RLS)      Sprain and strain of other specified sites of knee and leg     shin splints     History reviewed. No pertinent surgical history.  History reviewed. No pertinent family history.  Social History     Social History     Marital status: Single     Spouse name: N/A     Number of children: N/A     Years of education: N/A     Occupational History     Not on file.     Social History Main Topics     Smoking status: Never Smoker     Smokeless tobacco: Never Used     Alcohol use Yes      Comment: 3-4 times/year     Drug use: No     Sexual activity: No      Comment: never      Other Topics Concern     Parent/Sibling W/ Cabg, Mi Or Angioplasty Before 65f 55m? 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      Allergies   Allergen Reactions     No Known Drug Allergies          Objective:  /75  Ht 5' 7.28\" (1.709 m)  Wt 198 lb (89.8 kg)  BMI 30.75 kg/m2    General: Alert and in no distress    Head: Normocephalic, atraumatic  Eyes: no scleral icterus or conjunctival erythema   Oropharynx:  Mucous membranes moist  Skin: no erythema, petechiae, or jaundice  CV: regular rhythm by palpation, 2+ distal pulses  Resp: normal respiratory effort without conversational dyspnea   Psych: normal mood and affect    Gait: Non-antalgic, appropriate coordination and balance   Neuro: Motor strength and sensation as noted below    Musculoskeletal:    Bilateral Shoulder exam    Inspection and Posture:       normal    Skin:        no visible deformities    Tender:        Upper trapezius and periscapular region left    Non Tender:       remainder of shoulder bilaterally    ROM:        Full " active ROM with flexion, extension, abduction, adduction, internal and external rotation bilaterally.  Scapular movement appears symmetric.      Painful motions:  -Left periscapular pain with internal rotation behind the back.      Strength:        shoulder shrug 5/5 bilaterally       shoulder abduction 5/5 bilaterally       shoulder flexion 5/5 bilaterally       shoulder internal rotation 5/5 bilaterally       shoulder external rotation 5/5 bilaterally       elbow flexion 5/5 bilaterally       elbow extension 5/5 bilaterally       forearm pronation 5/5 bilaterally       forearm supination 5/5 bilaterally       wrist flexion 5/5 bilaterally       wrist extension 5/5 bilaterally        strength 5/5 bilaterally       finger abduction 5/5 bilaterally    Special testing:       neg (-) Neer bilaterally       neg (-) Mcwilliams bilaterally    Sensation:        normal sensation over shoulder and upper extremity     Radiology:  No imaging today    Assessment:  1. Chronic periscapular pain on left side        Plan:  Discussed the assessment with the patient and developed a plan together:  -Suspect periscapular muscle pain but labral pathology is also on the differential in this competitive   Rotator cuff etiology is less likely.  Recommend starting with conservative care as below:  -Physical therapy ordered at the [facility].  Recommend throwing analysis. Please do 5-6 days of exercises per week between formal sessions and the home exercises they provide.  -Ice or heat 15-20 minutes as needed (Avoid sleeping on a heating pad or ice)  -Patient's preferred over the counter medications as directed on packaging as needed for pain or soreness.  Please take ibuprofen with food. Do not premedicate prior to activity.  -Avoid aggravating activities.  -We also discussed MRI arthrogram of the shoulder.    -Follow up in 4-6 weeks or sooner if symptoms fail to improve or worsen.  Please call with  questions or concerns.    Alaina Abernathy MD, [facility] Sports Medicine  [facility]      Again, thank you for allowing me to participate in the care of your patient.        Sincerely,        Magda Abernathy MD

## 2018-08-03 NOTE — PROGRESS NOTES
Sports Medicine Clinic Visit    PCP: Arleth Negro    CC: Patient presents with:  Left Shoulder - Pain      HPI:  Maryann Villarreal is a 27 year old female who is seen as a self referral.   She notes left shoulder pain that began ~ 1.5 years ago with insidious onset. She has had pain for some time with throwing and the shoulder is now starting to bother her with ADLs, pulling a chainsaw at work. She denies any injury to the shoulder. She notes pain in the posterior shoulder. She rates the pain at a 9/10 at its worst and a 2/10 currently.  Symptoms are relieved with rest. Symptoms are worsened by pulling, throwing, and over use. She endorses numbness and tingling in the posterior arm and weakness.   She denies swelling, bruising, popping, grinding, catching, locking and instability.  Other treatment has included cold compresses and ibuprofen.       Review of Systems:  Musculoskeletal: as above  Remainder of review of systems is negative including constitutional, eyes, ENT, CV, pulmonary, GI, , endocrine, skin, hematologic, and neurologic except as noted in HPI or medical history.    History reviewed. No pertinent past surgical/medical/family/social history other than as mentioned in HPI.    Patient Active Problem List   Diagnosis     Other acne     Allergic rhinitis due to other allergen     Family history of leukemia     Pain in limb     Sprain and strain of other specified sites of knee and leg     Restless legs syndrome (RLS)     GERD (gastroesophageal reflux disease)     CARDIOVASCULAR SCREENING; LDL GOAL LESS THAN 130     Major depressive disorder, recurrent episode, moderate (H)     Major depression in complete remission (H)     Acute bilateral low back pain with left-sided sciatica     Past Medical History:   Diagnosis Date     Restless legs syndrome (RLS)      Sprain and strain of other specified sites of knee and leg     shin splints     History reviewed. No pertinent surgical history.  History reviewed.  "No pertinent family history.  Social History     Social History     Marital status: Single     Spouse name: N/A     Number of children: N/A     Years of education: N/A     Occupational History     Not on file.     Social History Main Topics     Smoking status: Never Smoker     Smokeless tobacco: Never Used     Alcohol use Yes      Comment: 3-4 times/year     Drug use: No     Sexual activity: No      Comment: never      Other Topics Concern     Parent/Sibling W/ Cabg, Mi Or Angioplasty Before 65f 55m? 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      Allergies   Allergen Reactions     No Known Drug Allergies          Objective:  /75  Ht 5' 7.28\" (1.709 m)  Wt 198 lb (89.8 kg)  BMI 30.75 kg/m2    General: Alert and in no distress    Head: Normocephalic, atraumatic  Eyes: no scleral icterus or conjunctival erythema   Oropharynx:  Mucous membranes moist  Skin: no erythema, petechiae, or jaundice  CV: regular rhythm by palpation, 2+ distal pulses  Resp: normal respiratory effort without conversational dyspnea   Psych: normal mood and affect    Gait: Non-antalgic, appropriate coordination and balance   Neuro: Motor strength and sensation as noted below    Musculoskeletal:    Bilateral Shoulder exam    Inspection and Posture:       normal    Skin:        no visible deformities    Tender:        Upper trapezius and periscapular region left    Non Tender:       remainder of shoulder bilaterally    ROM:        Full active ROM with flexion, extension, abduction, adduction, internal and external rotation bilaterally.  Scapular movement appears symmetric.      Painful motions:  -Left periscapular pain with internal rotation behind the back.      Strength:        " shoulder shrug 5/5 bilaterally       shoulder abduction 5/5 bilaterally       shoulder flexion 5/5 bilaterally       shoulder internal rotation 5/5 bilaterally       shoulder external rotation 5/5 bilaterally       elbow flexion 5/5 bilaterally       elbow extension 5/5 bilaterally       forearm pronation 5/5 bilaterally       forearm supination 5/5 bilaterally       wrist flexion 5/5 bilaterally       wrist extension 5/5 bilaterally        strength 5/5 bilaterally       finger abduction 5/5 bilaterally    Special testing:       neg (-) Neer bilaterally       neg (-) Mcwilliams bilaterally    Sensation:        normal sensation over shoulder and upper extremity     Radiology:  No imaging today    Assessment:  1. Chronic periscapular pain on left side        Plan:  Discussed the assessment with the patient and developed a plan together:  -Suspect periscapular muscle pain but labral pathology is also on the differential in this competitive   Rotator cuff etiology is less likely.  Recommend starting with conservative care as below:  -Physical therapy ordered at the [facility].  Recommend throwing analysis. Please do 5-6 days of exercises per week between formal sessions and the home exercises they provide.  -Ice or heat 15-20 minutes as needed (Avoid sleeping on a heating pad or ice)  -Patient's preferred over the counter medications as directed on packaging as needed for pain or soreness.  Please take ibuprofen with food. Do not premedicate prior to activity.  -Avoid aggravating activities.  -We also discussed MRI arthrogram of the shoulder.    -Follow up in 4-6 weeks or sooner if symptoms fail to improve or worsen.  Please call with questions or concerns.    Alaina Abernathy MD, [facility] Sports Medicine  [facility]

## 2018-08-03 NOTE — PATIENT INSTRUCTIONS
-Physical therapy ordered at the [facility].  Recommend throwing analysis. Please do 5-6 days of exercises per week between formal sessions and the home exercises they provide.  -Ice or heat 15-20 minutes as needed (Avoid sleeping on a heating pad or ice)  -Patient's preferred over the counter medications as directed on packaging as needed for pain or soreness.  Please take ibuprofen with food. Do not premedicate prior to activity.  -Avoid aggravating activities.  -We also discussed MRI of the shoulder with contrast.    -Follow up in 4-6 weeks or sooner if symptoms fail to improve or worsen.  Please call with questions or concerns.

## 2018-08-03 NOTE — MR AVS SNAPSHOT
After Visit Summary   8/3/2018    Maryann Villarreal    MRN: [id number]           Patient Information     Date Of Birth          1990        Visit Information        Provider Department      8/3/2018 2:00 PM Magda Abernathy MD [facility]        Today's Diagnoses     Chronic periscapular pain on left side    -  1      Care Instructions    -Physical therapy ordered at the [facility].  Recommend throwing analysis. Please do 5-6 days of exercises per week between formal sessions and the home exercises they provide.  -Ice or heat 15-20 minutes as needed (Avoid sleeping on a heating pad or ice)  -Patient's preferred over the counter medications as directed on packaging as needed for pain or soreness.  Please take ibuprofen with food. Do not premedicate prior to activity.  -Avoid aggravating activities.  -We also discussed MRI of the shoulder with contrast.    -Follow up in 4-6 weeks or sooner if symptoms fail to improve or worsen.  Please call with questions or concerns.                Follow-ups after your visit        Additional Services     [facility] PT, HAND, AND CHIROPRACTIC REFERRAL       **This order will print in the [facility] Scheduling Office**    Physical Therapy, Hand Therapy and Chiropractic Care are available through:    *[facility]  *[facility]  *[facility]    Call one number to schedule at any of the above locations: [phone].    Your provider has referred you to: Physical Therapy at [facility] or [facility]    Indication/Reason for Referral: Shoulder Pain  Onset of Illness: 1.5 years ago  Therapy Orders: Evaluate and Treat  Special Programs: Thrower's Injury, please do throwing analysis    Perez Todd      Additional Comments for the Therapist or Chiropractor:     Please be aware that coverage of these services is subject to the terms and limitations of your health insurance plan.  Call member services  "at your health plan with any benefit or coverage questions.      Please bring the following to your appointment:    *Your personal calendar for scheduling future appointments  *Comfortable clothing                  Who to contact     If you have questions or need follow up information about today's clinic visit or your schedule please contact [facility] directly at [phone].  Normal or non-critical lab and imaging results will be communicated to you by MyChart, letter or phone within 4 business days after the clinic has received the results. If you do not hear from us within 7 days, please contact the clinic through moblihart or phone. If you have a critical or abnormal lab result, we will notify you by phone as soon as possible.  Submit refill requests through Obvious or call your pharmacy and they will forward the refill request to us. Please allow 3 business days for your refill to be completed.          Additional Information About Your Visit        MyChart Information     Obvious gives you secure access to your electronic health record. If you see a primary care provider, you can also send messages to your care team and make appointments. If you have questions, please call your primary care clinic.  If you do not have a primary care provider, please call [phone] and they will assist you.        Care EveryWhere ID     This is your Care EveryWhere ID. This could be used by other organizations to access your [facility] medical records  [id number]        Your Vitals Were     Height BMI (Body Mass Index)                5' 7.28\" (1.709 m) 30.75 kg/m2           Blood Pressure from Last 3 Encounters:   08/03/18 118/75   10/17/17 126/70   10/15/17 135/76    Weight from Last 3 Encounters:   08/03/18 198 lb (89.8 kg)   10/17/17 210 lb 12.8 oz (95.6 kg)   10/15/17 202 lb (91.6 kg)              We Performed the Following     [facility] PT, HAND, AND CHIROPRACTIC REFERRAL        Primary Care Provider Office " Phone # Fax #    Arleth Negro PA-C [phone] [address] [place] [address]        Equal Access to Services     GISELLE REDD : Hadii octavio harrington sergeio Sosalmaali, waaxda luqadaha, qaybta kaalmada adexochitlda, arash gardner kelleytianna conner lacaseyximena roney. So [facility] [phone].    ATENCIÓN: Si habla español, tiene a ramírez disposición servicios gratuitos de asistencia lingüística. Llame al [phone].    We comply with applicable federal civil rights laws and [place] laws. We do not discriminate on the basis of race, color, national origin, age, disability, sex, sexual orientation, or gender identity.            Thank you!     Thank you for choosing [facility]  for your care. Our goal is always to provide you with excellent care. Hearing back from our patients is one way we can continue to improve our services. Please take a few minutes to complete the written survey that you may receive in the mail after your visit with us. Thank you!             Your Updated Medication List - Protect others around you: Learn how to safely use, store and throw away your medicines at www.disposemymeds.org.          This list is accurate as of 8/3/18  2:29 PM.  Always use your most recent med list.                   Brand Name Dispense Instructions for use Diagnosis    COLD & FLU SEVERE PO           ibuprofen 200 MG capsule           TYLENOL 325 MG tablet   Generic drug:  acetaminophen

## 2018-08-24 ENCOUNTER — THERAPY VISIT (OUTPATIENT)
Dept: PHYSICAL THERAPY | Facility: CLINIC | Age: 28
End: 2018-08-24
Payer: COMMERCIAL

## 2018-08-24 DIAGNOSIS — M25.512 LEFT SHOULDER PAIN: Primary | ICD-10-CM

## 2018-08-24 PROCEDURE — 97161 PT EVAL LOW COMPLEX 20 MIN: CPT | Mod: GP | Performed by: PHYSICAL THERAPIST

## 2018-08-24 PROCEDURE — 97140 MANUAL THERAPY 1/> REGIONS: CPT | Mod: GP | Performed by: PHYSICAL THERAPIST

## 2018-08-24 PROCEDURE — 97110 THERAPEUTIC EXERCISES: CPT | Mod: GP | Performed by: PHYSICAL THERAPIST

## 2018-08-24 NOTE — MR AVS SNAPSHOT
After Visit Summary   8/24/2018    Maryann Villarreal    MRN: [id number]           Patient Information     Date Of Birth          1990        Visit Information        Provider Department      8/24/2018 11:40 AM Shashank Ascencio, PT [facility]        Today's Diagnoses     Left shoulder pain    -  1       Follow-ups after your visit        Your next 10 appointments already scheduled     Aug 31, 2018 11:40 AM CDT   [facility] Throwing with Shashank Ascencio PT   [facility] Physical Therapy ([facility]  )    [address]  [facility] [address]   [phone]            Sep 07, 2018 11:40 AM CDT   [facility] Throwing with Shashank Ascencio PT   [facility] ([facility]  )    [address]  [facility] [id number] [phone]              Who to contact     If you have questions or need follow up information about today's clinic visit or your schedule please contact [facility]ERAP[facility] directly at [phone].  Normal or non-critical lab and imaging results will be communicated to you by MyChart, letter or phone within 4 business days after the clinic has received the results. If you do not hear from us within 7 days, please contact the clinic through [employer] Payments[employer] or phone. If you have a critical or abnormal lab result, we will notify you by phone as soon as possible.  Submit refill requests through Pictorama or call your pharmacy and they will forward the refill request to us. Please allow 3 business days for your refill to be completed.          Additional Information About Your Visit        [employer] Information     Pictorama gives you secure access to your electronic health record. If you see a primary care provider, you can also send messages to your care team and make appointments. If you have questions, please call  your primary care clinic.  If you do not have a primary care provider, please call [phone] and they will assist you.        Care EveryWhere ID     This is your Care EveryWhere ID. This could be used by other organizations to access your [place] medical records  [id number]         Blood Pressure from Last 3 Encounters:   08/03/18 118/75   10/17/17 126/70   10/15/17 135/76    Weight from Last 3 Encounters:   08/03/18 89.8 kg (198 lb)   10/17/17 95.6 kg (210 lb 12.8 oz)   10/15/17 91.6 kg (202 lb)              We Performed the Following     [facility] INITIAL EVAL REPORT     MANUAL THER TECH,1+REGIONS,EA 15 MIN        Primary Care Provider Office Phone # Fax #    Arleth Negro PA-C [phone] [address] [place] [address]        Equal Access to Services     [facility]: Hadii aad ku hadasho Soomaali, waaxda luqadaha, qaybta kaalmada adeegyada, waxay idiin hayaan sosa bennett . So [facility] [phone].    ATENCIÓN: Si habla español, tiene a ramírez disposición servicios gratuitos de asistencia lingüística. Mery al [phone].    We comply with applicable federal civil rights laws and [place] laws. We do not discriminate on the basis of race, color, national origin, age, disability, sex, sexual orientation, or gender identity.            Thank you!     Thank you for choosing [facility]YSICAL THERAPY  for your care. Our goal is always to provide you with excellent care. Hearing back from our patients is one way we can continue to improve our services. Please take a few minutes to complete the written survey that you may receive in the mail after your visit with us. Thank you!             Your Updated Medication List - Protect others around you: Learn how to safely use, store and throw away your medicines at www.disposemymeds.org.          This list is accurate as of 8/24/18  1:01 PM.  Always use your most recent med list.                   Brand  Name Dispense Instructions for use Diagnosis    COLD & FLU SEVERE PO           ibuprofen 200 MG capsule           TYLENOL 325 MG tablet   Generic drug:  acetaminophen

## 2018-08-24 NOTE — PROGRESS NOTES
[facility] Initial Evaluation  Subjective:  Patient is a 27 year old female presenting with rehab left shoulder hpi. The history is provided by the patient. No  was used.   Maryann Villarreal is a 27 year old female with a left shoulder condition.  Condition occurred with:  Repetition/overuse.  Condition occurred: during recreation/sport.  This is a new condition  Pain started in July from playing softball. Has been playing for years. Has had pain with softball in left soulder for awhile but has gotten worse and too bothersome since July..    Patient reports pain:  Scapular area and posterior.    Pain is described as shooting and burning and is intermittent and reported as 2/10 (9/10 at worse).  Associated symptoms:  Loss of strength. Pain is worse during the day.  Symptoms are exacerbated by lifting and certain positions and relieved by rest.  Since onset symptoms are gradually worsening.        General health as reported by patient is good.  Pertinent medical history includes:  Migraines/headaches and depression.  Medical allergies: no.    Current medications:  Anti-inflammatory.  Current occupation is [employer]  .  Patient is working in normal job without restrictions.  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    Barriers include:  None as reported by the patient.    Red flags:  None as reported by the patient.         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               Objective:  System    Physical Exam    General     ROS  Maryann Villarreal , : 1990, MRN: [id number]    Physical Therapy Objective Findings  Subjective information, goals, clinical impression, daily documentation and other information found in EPISODES tab.  Shoulder Objective Findings  Posture Comments: Rounded shoulders  Screens:                                                                                            Right                                                         Left                          Cervical (ROM, quadrant)  Normal ROM  -quad   Thoracic Mobility     TOS (mazin, Jacki, Carlota, Leary)              Range of Motion:                                             Right AROM          Right PROM            Left AROM             Left PROM           Flexion wnl wnl wnl wnl   Abduction wnl wnl wnl wnl   External Rotation 100 in 0 degrees 100 in 90 degrees 90 in 0 degrees 105 in 90 degrees   Internal Rotation T3 in 0 degrees 60 in 90 degrees T7 in 0 degrees 55 in 90 degrees   Other:           Manual Muscle Testing (graded 0-5, measured at 0 degrees unless otherwise noted):                                                                                                  Right                                                    Left                             Flexion 5 5   Abduction 5 5 (+)   External Rotation (at 0) 5 5   Internal Rotation (at 0) 5 5   Extension 5 5   Mid Trap 4 4   Lower Trap 4- 4-   Other:     (+ mild pain, ++ moderate pain, +++ severe pain)    Special Tests:                                                                                             Right                                                    Left                             NEER'S  -   Mcwilliams/Diego  -   Coracoid  -   Bursa  -   [place]'s  -   Load and Shift      Relocation test  -   Sulcus test     Crossover     OTHER: david  Full can  Va  crank  -  -  -  -     Flexibility:                                                                                                 Right                                                    Left                             Pec Minor (supine) 3 finger tightness 3 finger tightness   Other     Other       Palpation:  Rib around T5 protruding under scapula area. Tight pec minors.     Assessment/Plan:    Patient is a 27 year old female with left side shoulder complaints.    Patient has the following significant findings  with corresponding treatment plan.                Diagnosis 1:  LEft shoulder pain  Pain -  manual therapy, STS, education, directional preference exercise and home program  Decreased strength - therapeutic exercise, therapeutic activities and home program  Decreased function - therapeutic activities and home program    Therapy Evaluation Codes:   1) History comprised of:   Personal factors that impact the plan of care:      None.    Comorbidity factors that impact the plan of care are:      None.     Medications impacting care: None.  2) Examination of Body Systems comprised of:   Body structures and functions that impact the plan of care:      Shoulder and Thoracic Spine.   Activity limitations that impact the plan of care are:      Throwing.  3) Clinical presentation characteristics are:   Stable/Uncomplicated.  4) Decision-Making    Low complexity using standardized patient assessment instrument and/or measureable assessment of functional outcome.  Cumulative Therapy Evaluation is: Low complexity.    Previous and current functional limitations:  (See Goal Flow Sheet for this information)    Short term and Long term goals: (See Goal Flow Sheet for this information)     Communication ability:  Patient appears to be able to clearly communicate and understand verbal and written communication and follow directions correctly.  Treatment Explanation - The following has been discussed with the patient:   RX ordered/plan of care  Anticipated outcomes  Possible risks and side effects  This patient would benefit from PT intervention to resume normal activities.   Rehab potential is excellent.    Frequency:  1 X week, once daily  Duration:  for 6 weeks  Discharge Plan:  Achieve all LTG.  Independent in home treatment program.  Reach maximal therapeutic benefit.    Please refer to the daily flowsheet for treatment today, total treatment time and time spent performing 1:1 timed codes.     Taet Ken SPT  Shashank Ascencio DPT  OCS

## 2018-08-31 ENCOUNTER — THERAPY VISIT (OUTPATIENT)
Dept: PHYSICAL THERAPY | Facility: CLINIC | Age: 28
End: 2018-08-31
Payer: COMMERCIAL

## 2018-08-31 DIAGNOSIS — G89.29 CHRONIC LEFT SHOULDER PAIN: ICD-10-CM

## 2018-08-31 DIAGNOSIS — M25.512 CHRONIC LEFT SHOULDER PAIN: ICD-10-CM

## 2018-08-31 PROCEDURE — 97110 THERAPEUTIC EXERCISES: CPT | Mod: GP | Performed by: PHYSICAL THERAPIST

## 2018-08-31 PROCEDURE — 97112 NEUROMUSCULAR REEDUCATION: CPT | Mod: GP | Performed by: PHYSICAL THERAPIST

## 2018-08-31 PROCEDURE — 97140 MANUAL THERAPY 1/> REGIONS: CPT | Mod: GP | Performed by: PHYSICAL THERAPIST

## 2018-09-07 ENCOUNTER — THERAPY VISIT (OUTPATIENT)
Dept: PHYSICAL THERAPY | Facility: CLINIC | Age: 28
End: 2018-09-07
Payer: COMMERCIAL

## 2018-09-07 DIAGNOSIS — G89.29 CHRONIC LEFT SHOULDER PAIN: ICD-10-CM

## 2018-09-07 DIAGNOSIS — M25.512 CHRONIC LEFT SHOULDER PAIN: ICD-10-CM

## 2018-09-07 PROCEDURE — 97110 THERAPEUTIC EXERCISES: CPT | Mod: GP | Performed by: PHYSICAL THERAPIST

## 2018-09-07 PROCEDURE — 97112 NEUROMUSCULAR REEDUCATION: CPT | Mod: GP | Performed by: PHYSICAL THERAPIST

## 2018-09-07 NOTE — MR AVS SNAPSHOT
After Visit Summary   9/7/2018    Maryann Villarreal    MRN: [id number]           Patient Information     Date Of Birth          1990        Visit Information        Provider Department      9/7/2018 11:40 AM Shashank Ascencio PT [facility]        Today's Diagnoses     Chronic left shoulder pain           Follow-ups after your visit        Who to contact     If you have questions or need follow up information about today's clinic visit or your schedule please contact [facility] ATHLETIC [facility] PHYSICAL [facility] directly at [phone].  Normal or non-critical lab and imaging results will be communicated to you by Linkoveryhart, letter or phone within 4 business days after the clinic has received the results. If you do not hear from us within 7 days, please contact the clinic through Linkoveryhart or phone. If you have a critical or abnormal lab result, we will notify you by phone as soon as possible.  Submit refill requests through InterResolve or call your pharmacy and they will forward the refill request to us. Please allow 3 business days for your refill to be completed.          Additional Information About Your Visit        MyChart Information     InterRes[employer] gives you secure access to your electronic health record. If you see a primary care provider, you can also send messages to your care team and make appointments. If you have questions, please call your primary care clinic.  If you do not have a primary care provider, please call [phone] and they will assist you.        Care EveryWhere ID     This is your Care EveryWhere ID. This could be used by other organizations to access your [facility] medical records  [id number]         Blood Pressure from Last 3 Encounters:   08/03/18 118/75   10/17/17 126/70   10/15/17 135/76    Weight from Last 3 Encounters:   08/03/18 89.8 kg (198 lb)   10/17/17 95.6 kg (210 lb 12.8 oz)   10/15/17 91.6 kg (202  chano)              We Performed the Following     NEUROMUSCULAR RE-EDUCATION     THERAPEUTIC EXERCISES        Primary Care Provider Office Phone # Fax #    Arleth Negro PA-C [phone] [address] [place] [address]        Equal Access to Services     GISELLE REDD : Hadii octavio ku hadmaryanno Soomaali, waaxda luqadaha, qaybta kaalmada adeegyada, arash tammyin hayaan kelleytianna ocnner lacaseyximena . So [facility] [phone].    ATENCIÓN: Si habla español, tiene a ramírez disposición servicios gratuitos de asistencia lingüística. Llame al [phone].    We comply with applicable federal civil rights laws and [place] laws. We do not discriminate on the basis of race, color, national origin, age, disability, sex, sexual orientation, or gender identity.            Thank you!     Thank you for choosing [facility]  for your care. Our goal is always to provide you with excellent care. Hearing back from our patients is one way we can continue to improve our services. Please take a few minutes to complete the written survey that you may receive in the mail after your visit with us. Thank you!             Your Updated Medication List - Protect others around you: Learn how to safely use, store and throw away your medicines at www.disposemymeds.org.          This list is accurate as of 9/7/18 12:20 PM.  Always use your most recent med list.                   Brand Name Dispense Instructions for use Diagnosis    COLD & FLU SEVERE PO           ibuprofen 200 MG capsule           TYLENOL 325 MG tablet   Generic drug:  acetaminophen

## 2018-10-16 NOTE — PROGRESS NOTES
SUBJECTIVE:   Maryann Villarreal is a 28 year old female who presents to clinic today for the following health issues:    States that she does not remember any type of specific trauma to the leg but simply noticed the pain to have started after she was in playing in a softball game on a rough field.  She plays outfield and recalls having done a lot of running with 1 of the ball games the night before.    Pain has not resolved since August and the initial time she noticed the pain.    HPI  Joint Pain    Onset: August    Description:   Location: right knee  Character: Sharp and Dull ache    Intensity: mild, severe    Progression of Symptoms: worse    Accompanying Signs & Symptoms:  Other symptoms: radiation of pain from calf up to knee    History:   Previous similar pain: no       Precipitating factors:   Trauma or overuse: no     Alleviating factors:  Improved by: nothing    Therapies Tried and outcome: Ice, heat, compression sleeve       Problem list and histories reviewed & adjusted, as indicated.  Additional history: as documented    Patient Active Problem List   Diagnosis     Other acne     Allergic rhinitis due to other allergen     Family history of leukemia     Pain in limb     Sprain and strain of other specified sites of knee and leg     Restless legs syndrome (RLS)     GERD (gastroesophageal reflux disease)     CARDIOVASCULAR SCREENING; LDL GOAL LESS THAN 130     Major depressive disorder, recurrent episode, moderate (H)     Major depression in complete remission (H)     Acute bilateral low back pain with left-sided sciatica     Left shoulder pain     History reviewed. No pertinent surgical history.    Social History   Substance Use Topics     Smoking status: Never Smoker     Smokeless tobacco: Never Used     Alcohol use Yes      Comment: 3-4 times/year     History reviewed. No pertinent family history.      Current Outpatient Prescriptions   Medication Sig Dispense Refill     IBUPROFEN 200 MG OR CAPS   0  "    Phenylephrine-DM-GG-APAP (COLD & FLU SEVERE PO)        TYLENOL 325 MG OR TABS  (Patient not taking: Reported on 10/19/2018)  0     Allergies   Allergen Reactions     No Known Drug Allergies      BP Readings from Last 3 Encounters:   10/19/18 126/80   08/03/18 118/75   10/17/17 126/70    Wt Readings from Last 3 Encounters:   10/19/18 197 lb 9.6 oz (89.6 kg)   08/03/18 198 lb (89.8 kg)   10/17/17 210 lb 12.8 oz (95.6 kg)         ROS:  Constitutional, HEENT, cardiovascular, pulmonary, GI, , musculoskeletal, neuro, skin, endocrine and psych systems are negative, except as otherwise noted.    OBJECTIVE:     /80 (Cuff Size: Adult Regular)  Pulse 64  Temp 97.9  F (36.6  C) (Temporal)  Resp 16  Ht 5' 7.28\" (1.709 m)  Wt 197 lb 9.6 oz (89.6 kg)  BMI 30.69 kg/m2  Body mass index is 30.69 kg/(m^2).  GENERAL: healthy, alert and no distress  RESP: lungs clear to auscultation - no rales, rhonchi or wheezes  CV: regular rate and rhythm, normal S1 S2, no S3 or S4, no murmur, click or rub, no peripheral edema and peripheral pulses strong  MS: An essentially normal examination of the is noted today with a negative Eleonora's and negative Lachman's.  She does have tenderness over the proximal anterior tibia at the insertion of the quadriceps more laterally than medially.  Interestingly enough she notes that when in this discomfort and pain is at its worse it does radiate medially and superior to this region around the medial aspect of the knee.  SKIN: no suspicious lesions or rashes to visible skin today.  NEURO: Normal strength and tone, mentation intact and speech normal  PSYCH: mentation appears normal, affect normal/bright    Diagnostic Test Results:  No results found for this or any previous visit (from the past 24 hour(s)).    X-ray: negative for pathology today to my eye.  It will be overread by radiology.    ASSESSMENT/PLAN:     BMI:   Estimated body mass index is 30.69 kg/(m^2) as calculated from the " "following:    Height as of this encounter: 5' 7.28\" (1.709 m).    Weight as of this encounter: 197 lb 9.6 oz (89.6 kg).   Weight management plan: Patient was referred to their PCP to discuss a diet and exercise plan.      1. Chronic pain of right knee  - XR Knee Standing Right 2 Views; Future  - ORTHOPEDICS ADULT REFERRAL    2. Need for prophylactic vaccination and inoculation against influenza  3. Screening for HIV (human immunodeficiency virus)  4. Screening for malignant neoplasm of cervix  Declines HM advice      Nazario Evans PA-C  [facility]  Answers for HPI/ROS submitted by the patient on 10/19/2018   If you checked off any problems, how difficult have these problems made it for you to do your work, take care of things at home, or get along with other people?: Somewhat difficult  PHQ9 TOTAL SCORE: 5  DEMIAN 7 TOTAL SCORE: 8    "

## 2018-10-19 ENCOUNTER — OFFICE VISIT (OUTPATIENT)
Dept: ORTHOPEDICS | Facility: OTHER | Age: 28
End: 2018-10-19
Attending: PHYSICIAN ASSISTANT
Payer: COMMERCIAL

## 2018-10-19 ENCOUNTER — RADIANT APPOINTMENT (OUTPATIENT)
Dept: GENERAL RADIOLOGY | Facility: OTHER | Age: 28
End: 2018-10-19
Attending: PHYSICIAN ASSISTANT
Payer: COMMERCIAL

## 2018-10-19 ENCOUNTER — OFFICE VISIT (OUTPATIENT)
Dept: FAMILY MEDICINE | Facility: OTHER | Age: 28
End: 2018-10-19
Payer: COMMERCIAL

## 2018-10-19 VITALS
BODY MASS INDEX: 31.01 KG/M2 | RESPIRATION RATE: 16 BRPM | DIASTOLIC BLOOD PRESSURE: 80 MMHG | HEIGHT: 67 IN | HEART RATE: 64 BPM | SYSTOLIC BLOOD PRESSURE: 126 MMHG | TEMPERATURE: 97.9 F | WEIGHT: 197.6 LBS

## 2018-10-19 VITALS
SYSTOLIC BLOOD PRESSURE: 110 MMHG | HEIGHT: 67 IN | TEMPERATURE: 98.4 F | DIASTOLIC BLOOD PRESSURE: 74 MMHG | WEIGHT: 197.6 LBS | BODY MASS INDEX: 31.01 KG/M2

## 2018-10-19 DIAGNOSIS — Z12.4 SCREENING FOR MALIGNANT NEOPLASM OF CERVIX: ICD-10-CM

## 2018-10-19 DIAGNOSIS — Z23 NEED FOR PROPHYLACTIC VACCINATION AND INOCULATION AGAINST INFLUENZA: ICD-10-CM

## 2018-10-19 DIAGNOSIS — M25.561 CHRONIC PAIN OF RIGHT KNEE: Primary | ICD-10-CM

## 2018-10-19 DIAGNOSIS — Z11.4 SCREENING FOR HIV (HUMAN IMMUNODEFICIENCY VIRUS): ICD-10-CM

## 2018-10-19 DIAGNOSIS — M25.561 CHRONIC PAIN OF RIGHT KNEE: ICD-10-CM

## 2018-10-19 DIAGNOSIS — M22.2X1 PATELLOFEMORAL DISORDER OF RIGHT KNEE: Primary | ICD-10-CM

## 2018-10-19 DIAGNOSIS — G89.29 CHRONIC PAIN OF RIGHT KNEE: ICD-10-CM

## 2018-10-19 DIAGNOSIS — G89.29 CHRONIC PAIN OF RIGHT KNEE: Primary | ICD-10-CM

## 2018-10-19 PROCEDURE — 73560 X-RAY EXAM OF KNEE 1 OR 2: CPT | Mod: RT

## 2018-10-19 PROCEDURE — 99213 OFFICE O/P EST LOW 20 MIN: CPT | Performed by: PHYSICIAN ASSISTANT

## 2018-10-19 PROCEDURE — 99243 OFF/OP CNSLTJ NEW/EST LOW 30: CPT | Performed by: ORTHOPAEDIC SURGERY

## 2018-10-19 ASSESSMENT — PATIENT HEALTH QUESTIONNAIRE - PHQ9
10. IF YOU CHECKED OFF ANY PROBLEMS, HOW DIFFICULT HAVE THESE PROBLEMS MADE IT FOR YOU TO DO YOUR WORK, TAKE CARE OF THINGS AT HOME, OR GET ALONG WITH OTHER PEOPLE: SOMEWHAT DIFFICULT
SUM OF ALL RESPONSES TO PHQ QUESTIONS 1-9: 5
SUM OF ALL RESPONSES TO PHQ QUESTIONS 1-9: 5

## 2018-10-19 ASSESSMENT — ANXIETY QUESTIONNAIRES
7. FEELING AFRAID AS IF SOMETHING AWFUL MIGHT HAPPEN: NOT AT ALL
5. BEING SO RESTLESS THAT IT IS HARD TO SIT STILL: SEVERAL DAYS
1. FEELING NERVOUS, ANXIOUS, OR ON EDGE: SEVERAL DAYS
3. WORRYING TOO MUCH ABOUT DIFFERENT THINGS: SEVERAL DAYS
6. BECOMING EASILY ANNOYED OR IRRITABLE: NEARLY EVERY DAY
2. NOT BEING ABLE TO STOP OR CONTROL WORRYING: SEVERAL DAYS
GAD7 TOTAL SCORE: 8
4. TROUBLE RELAXING: SEVERAL DAYS
GAD7 TOTAL SCORE: 8
GAD7 TOTAL SCORE: 8
7. FEELING AFRAID AS IF SOMETHING AWFUL MIGHT HAPPEN: NOT AT ALL

## 2018-10-19 ASSESSMENT — PAIN SCALES - GENERAL
PAINLEVEL: SEVERE PAIN (6)
PAINLEVEL: MILD PAIN (3)

## 2018-10-19 NOTE — MR AVS SNAPSHOT
After Visit Summary   10/19/2018    Maryann Villarreal    MRN: [id number]           Patient Information     Date Of Birth          1990        Visit Information        Provider Department      10/19/2018 2:00 PM Aimee De La Paz MD [facility]        Today's Diagnoses     Patellofemoral disorder of right knee    -  1      Care Instructions      Understanding Patellofemoral Syndrome    Patellofemoral syndrome is a condition that causes pain on the front of the knee. The large bones of the upper and lower leg meet at the knee. This joint also includes a small triangle-shaped bone that rests on top of the leg bones. This is the kneecap (patella). Patellofemoral refers to the patella and the thigh bone (femur). These bones are surrounded by connective tissue and muscles. Patellofemoral pain is believed to come from stress on the tissues of and around the knee.  What causes patellofemoral syndrome?  No single cause for patellofemoral pain has been found. But many things are likely to contribute to this type of knee pain. These include:    Actions that put repeated stress on the knee, such as running and squatting    Overtraining at a sport    Weak hip or thigh muscle    Normal variations in the way body parts fit together    Poor form during activities that stress the knee, such as running    A fall or blow to the knee  Symptoms of patellofemoral syndrome  Pain is a common symptom. It s often on the front of the knee, but can be around the kneecap. Pain can occur at certain times, such as when you are:    Running    Sitting for a long time with your knees bent, such as at a movie    Walking up or down stairs    Squatting  Other symptoms may include:    A feeling of the knee catching or locking    A grinding or crackling noise in your knee  Treatment for patellofemoral syndrome  Treatment focuses on reducing pain and avoiding further injury. Treatments may include:    Rest your leg. This  gives your knee time to recover. You may need to avoid or change the activity that caused the problem, such as not running for a while.    Prescription or over-the-counter pain medicines. These help reduce inflammation, swelling, and pain.    Cold packs. These help reduce pain.    Stretches and other exercises. These can improve balance, flexibility, and strength.    A shoe insert (orthotic). This can make your knee more stable.    Elastic tape or a brace. These can make your knee more stable.    Physical therapy. This may include exercises or other treatments.    Surgery. In rare cases, if other treatments don t relieve symptoms, you may need surgery.  Possible complications of patellofemoral syndrome  If you don t give your knee time to heal, symptoms may return or get worse. Follow your healthcare provider s instructions on resting and treating your knee.  When to call your healthcare provider  Call your healthcare provider right away if you have any of these:    Fever of 100.4 F (38 C) or higher, or as directed    Pain that gets worse    Symptoms that don t get better, or get worse    New symptoms   Date Last Reviewed: 3/10/2016    8552-6174 [email or web address]. [address] [place] [address]. All rights reserved. This information is not intended as a substitute for professional medical care. Always follow your healthcare professional's instructions.        Quad Set for Leg and Knee    This exercise is designed to stretch and strengthen your knee. Before beginning, read through all the instructions. While exercising, breathe normally and use smooth movements. If you feel any pain, stop the exercise. If pain persists, call your healthcare provider.  1.  Sit on the floor with one leg straight, the other bent.  2.  Flex the foot of your straight leg by pointing your toes toward you. Press the back of your knee into the floor while tightening the muscle on the top of your thigh. Hold for ______  seconds. Then relax.  3.  Repeat ______ times. Do ______ sets a day.  Caution    Don t arch your back.    Don t hunch your shoulders.  Date Last Reviewed: 9/20/2015 2000-2017 [employer]. [address] [place] [address]. All rights reserved. This information is not intended as a substitute for professional medical care. Always follow your healthcare professional's instructions.        Knee Rehabilitation: Straight Leg Raises    Begin your rehabilitation with exercises that develop muscle control. These help you meet basic goals, like driving a car or going back to work. Exercise as often as you re advised. But stop right away if any exercise causes sharp or increasing pain. Icing your knee for 15 to 20 minutes after exercise can help prevent swelling and soreness.    Sit or lie on the floor with your injured leg straight and the other leg bent. Point the toes on your injured leg straight up.    Raise your injured leg a few inches off the floor.    Hold for 10 seconds. Repeat 5 times. Rest for a minute, and then do another set. Do 2 to 3 sessions per day.  Note: Do this exercise with toes turned out to strengthen inner thigh muscles.  Date Last Reviewed: 8/16/2015 2000-2017 [employer]. [address] [place] [address]. All rights reserved. This information is not intended as a substitute for professional medical care. Always follow your healthcare professional's instructions.                Follow-ups after your visit        Follow-up notes from your care team     Return if symptoms worsen or fail to improve.      Who to contact     If you have questions or need follow up information about today's clinic visit or your schedule please contact [facility] directly at [phone].  Normal or non-critical lab and imaging results will be communicated to you by MyChart, letter or phone within 4 business days after the clinic has received the results. If  "you do not hear from us within 7 days, please contact the clinic through [employer]) or phone. If you have a critical or abnormal lab result, we will notify you by phone as soon as possible.  Submit refill requests through [employer]) or call your pharmacy and they will forward the refill request to us. Please allow 3 business days for your refill to be completed.          Additional Information About Your Visit        [employer] Information     [employer]) gives you secure access to your electronic health record. If you see a primary care provider, you can also send messages to your care team and make appointments. If you have questions, please call your primary care clinic.  If you do not have a primary care provider, please call [phone] and they will assist you.        Care EveryWhere ID     This is your Care EveryWhere ID. This could be used by other organizations to access your [facility] medical records  [id number]        Your Vitals Were     Temperature Height BMI (Body Mass Index)             98.4  F (36.9  C) (Temporal) 1.709 m (5' 7.28\") 30.69 kg/m2          Blood Pressure from Last 3 Encounters:   10/19/18 110/74   10/19/18 126/80   08/03/18 118/75    Weight from Last 3 Encounters:   10/19/18 89.6 kg (197 lb 9.6 oz)   10/19/18 89.6 kg (197 lb 9.6 oz)   08/03/18 89.8 kg (198 lb)              Today, you had the following     No orders found for display       Primary Care Provider Office Phone # Fax #    Arleth Negro PA-C [phone] [address] [place] [address]        Equal Access to Services     [facility]: Hadii octavio harrington hadasho Solasha, waaxda luqadaha, qaybta kaalmada arash dunbar. So [facility] [phone].    ATENCIÓN: Si habla español, tiene a ramírez disposición servicios gratuitos de asistencia lingüística. Llame al [phone].    We comply with applicable federal civil rights laws and [place] laws. We do not discriminate on the basis of " race, color, national origin, age, disability, sex, sexual orientation, or gender identity.            Thank you!     Thank you for choosing [facility] [place]  for your care. Our goal is always to provide you with excellent care. Hearing back from our patients is one way we can continue to improve our services. Please take a few minutes to complete the written survey that you may receive in the mail after your visit with us. Thank you!             Your Updated Medication List - Protect others around you: Learn how to safely use, store and throw away your medicines at www.disposemymeds.org.          This list is accurate as of 10/19/18  2:38 PM.  Always use your most recent med list.                   Brand Name Dispense Instructions for use Diagnosis    COLD & FLU SEVERE PO           ibuprofen 200 MG capsule           TYLENOL 325 MG tablet   Generic drug:  acetaminophen

## 2018-10-19 NOTE — LETTER
"    10/19/2018         RE: Maryann Villarreal  [address] [address] [place] [id number]        Dear Colleague,    Thank you for referring your patient, Maryann Villarreal, to the [facility]. Please see a copy of my visit note below.    ORTHOPEDIC CONSULT      Chief Complaint: Maryann Villarreal is a 28 year old female who works as a  and plays softball.        She is being seen for   Chief Complaints and History of Present Illnesses   Patient presents with     Consult     Right Knee pain       History of Present Illness:   Maryann Villarreal is a 28 year old female who is seen in consultation at the request of Nazario Ovalle.  History of Present illness:  Maryann presents for evaluation of:   1.) Chronic Right knee pain  Onset:  August 2018  Symptoms brought on by Not sure, but started after a playing 8 softball games in a row.   Character:  sharp and dull ache.    Progression of symptoms:  worse.    Previous similar pain: no .   Pain Level:  6/10.   Previous treatments:  heat, ice, support wrap, acetaminophen and Ibuprofen - no help  Currently on Blood thinners? NO  Diagnosis of Diabetes? NO    /74 (BP Location: Right arm, Patient Position: Sitting, Cuff Size: Adult Large)  Temp 98.4  F (36.9  C) (Temporal)  Ht 1.709 m (5' 7.28\")  Wt 89.6 kg (197 lb 9.6 oz)  BMI 30.69 kg/m2  Body mass index is 30.69 kg/(m^2).  5' 7.28\"  197 lbs 9.6 oz    Sahara Cummings, HUDSON    Pain and tenderness over proximal tibia, hurts when it is hit, worse with extending leg and stairs      Patient's past medical, surgical, social and family histories reviewed.       Past Medical History:   Diagnosis Date     Restless legs syndrome (RLS)      Sprain and strain of other specified sites of knee and leg     shin splints         No past surgical history on file.      Medications:    Current Outpatient Prescriptions on File Prior to Visit:  IBUPROFEN 200 MG OR CAPS    Phenylephrine-DM-GG-APAP (COLD & FLU SEVERE PO)    TYLENOL " "325 MG OR TABS      No current facility-administered medications on file prior to visit.       Allergies   Allergen Reactions     No Known Drug Allergies          Social History     Occupational History     Not on file.     Social History Main Topics     Smoking status: Never Smoker     Smokeless tobacco: Never Used     Alcohol use Yes      Comment: 3-4 times/year     Drug use: No     Sexual activity: No      Comment: never        Patient does not use Tobacco products.      No family history on file.      REVIEW OF SYSTEMS  10 point review systems performed otherwise negative as noted as per history of present illness.      Physical Exam:  Vitals: /74 (BP Location: Right arm, Patient Position: Sitting, Cuff Size: Adult Large)  Temp 98.4  F (36.9  C) (Temporal)  Ht 1.709 m (5' 7.28\")  Wt 89.6 kg (197 lb 9.6 oz)  BMI 30.69 kg/m2  BMI= Body mass index is 30.69 kg/(m^2).    Constitutional: healthy, alert and no acute distress   Psychiatric: mentation appears normal and affect normal/bright  NEURO: no focal deficits  RESP: Normal with easy respirations and no use of accessory muscles to breathe, no audible wheezing or retractions  CV: regular pulse  SKIN: No erythema, rashes, excoriation, or breakdown. No evidence of infection.   JOINT/EXTREMITIES:right Knee Exam: Inspection: AP/lateral alignment normal, No effusion, No quad atrophy  Tender: patellar tendon insertion  Non-tender: lateral patellar facet, medial patellar facet, inferior pole patella, quadriceps insertion, lateral joint line, medial joint line  Active Range of Motion: pain with flexion  Strength: normal  Special tests: normal Valgus stress test, normal Varus, negative Lachman's test    GAIT: non-antalgic  Lymph: no palpable lymph nodes    Diagnostic Modalities:  right knee X-ray: No fracture, dislocation and or lesion. Normal alignment.  Joint space maintained no significant arthritis. No appreciable soft tissue abnormality  Independent " visualization of the images was performed.      Impression: right Patellofemoral syndrome    Plan:  All of the above pertinent physical exam and imaging modalities findings was reviewed with Maryann.  I would not recommend cortisone injection at her patellar tendon insertion since that could predispose her to a rupture.                                          CONSERVATIVE CARE:  I recommend conservative care for the patient to include NSAIDs, Tylenol, focused self directed physical therapy, activity modifications, bracing , rest. Today I provided or dispensed brace- knee, info and hep.                                                FUTURE PLAN:  On their return if they still have symptoms we will consider physical therapy, MRI, NSAID's.    BP Readings from Last 1 Encounters:   10/19/18 110/74       BP noted to be well controlled today in office.     Return to clinic PRN, or sooner as needed for changes.  Re-x-ray on return: No    Aimee De La Paz M.D.        ORTHOPEDIC CONSULT        Chief Complaint: Maryann Villarreal is a 28 year old female who works as a  and plays softball.          She is being seen for        Chief Complaints and History of Present Illnesses   Patient presents with     Consult       Right Knee pain         History of Present Illness:   Maryann Villarreal is a 28 year old female who is seen in consultation at the request of Nazario Ovalle.  History of Present illness:  Maryann presents for evaluation of:   1.) Chronic Right knee pain  Onset:  August 2018  Symptoms brought on by Not sure, but started after a playing 8 softball games in a row.   Character:  sharp and dull ache.    Progression of symptoms:  worse.    Previous similar pain: no .   Pain Level:  6/10.   Previous treatments:  heat, ice, support wrap, acetaminophen and Ibuprofen - no help  Currently on Blood thinners? NO  Diagnosis of Diabetes? NO     /74 (BP Location: Right arm, Patient Position: Sitting, Cuff Size:  "Adult Large)  Temp 98.4  F (36.9  C) (Temporal)  Ht 1.709 m (5' 7.28\")  Wt 89.6 kg (197 lb 9.6 oz)  BMI 30.69 kg/m2  Body mass index is 30.69 kg/(m^2).  5' 7.28\"  197 lbs 9.6 oz     Sahara Pittstammie, HUDSON     Pain and tenderness over proximal tibia, hurts when it is hit, worse with extending leg and stairs        Patient's past medical, surgical, social and family histories reviewed.          Past Medical History         Past Medical History:   Diagnosis Date     Restless legs syndrome (RLS)       Sprain and strain of other specified sites of knee and leg       shin splints                Past Surgical History    No past surgical history on file.           Medications:     Current Outpatient Prescriptions on File Prior to Visit:  IBUPROFEN 200 MG OR CAPS     Phenylephrine-DM-GG-APAP (COLD & FLU SEVERE PO)     TYLENOL 325 MG OR TABS        No current facility-administered medications on file prior to visit.              Allergies   Allergen Reactions     No Known Drug Allergies              Social History          Occupational History     Not on file.              Social History Main Topics      Smoking status: Never Smoker      Smokeless tobacco: Never Used      Alcohol use Yes         Comment: 3-4 times/year      Drug use: No      Sexual activity: No          Comment: never           Patient does not use Tobacco products.         Family History    No family history on file.           REVIEW OF SYSTEMS  10 point review systems performed otherwise negative as noted as per history of present illness.        Physical Exam:  Vitals: /74 (BP Location: Right arm, Patient Position: Sitting, Cuff Size: Adult Large)  Temp 98.4  F (36.9  C) (Temporal)  Ht 1.709 m (5' 7.28\")  Wt 89.6 kg (197 lb 9.6 oz)  BMI 30.69 kg/m2  BMI= Body mass index is 30.69 kg/(m^2).     Constitutional: healthy, alert and no acute distress   Psychiatric: mentation appears normal and affect normal/bright  NEURO: no focal deficits  RESP: " Normal with easy respirations and no use of accessory muscles to breathe, no audible wheezing or retractions  CV: regular pulse  SKIN: No erythema, rashes, excoriation, or breakdown. No evidence of infection.   JOINT/EXTREMITIES:right Knee Exam: Inspection: AP/lateral alignment normal, No effusion, No quad atrophy  Tender: patellar tendon insertion  Non-tender: lateral patellar facet, medial patellar facet, inferior pole patella, quadriceps insertion, lateral joint line, medial joint line  Active Range of Motion: pain with flexion  Strength: normal  Special tests: normal Valgus stress test, normal Varus, negative Lachman's test     GAIT: non-antalgic  Lymph: no palpable lymph nodes     Diagnostic Modalities:  right knee X-ray: No fracture, dislocation and or lesion. Normal alignment.  Joint space maintained no significant arthritis. No appreciable soft tissue abnormality  Independent visualization of the images was performed.        Impression: right Patellofemoral syndrome     Plan:  All of the above pertinent physical exam and imaging modalities findings was reviewed with Maryann.  I would not recommend cortisone injection at her patellar tendon insertion since that could predispose her to a rupture.                                           CONSERVATIVE CARE:  I recommend conservative care for the patient to include NSAIDs, Tylenol, focused self directed physical therapy, activity modifications, bracing , rest. Today I provided or dispensed brace- knee, info and hep.                                                FUTURE PLAN:  On their return if they still have symptoms we will consider physical therapy, MRI, NSAID's.         BP Readings from Last 1 Encounters:   10/19/18 110/74         BP noted to be well controlled today in office.      Return to clinic PRN, or sooner as needed for changes.  Re-x-ray on return: No     Aimee De La Paz M.D.      Again, thank you for allowing me to participate in the care of  your patient.        Sincerely,        Aimee De La Paz MD

## 2018-10-19 NOTE — MR AVS SNAPSHOT
After Visit Summary   10/19/2018    Maryann Villarreal    MRN: [id number]           Patient Information     Date Of Birth          1990        Visit Information        Provider Department      10/19/2018 9:20 AM Nazario De Los Santos PA-C [facility]        Today's Diagnoses     Chronic pain of right knee    -  1    Need for prophylactic vaccination and inoculation against influenza        Screening for HIV (human immunodeficiency virus)        Screening for malignant neoplasm of cervix          Care Instructions                    Patellofemoral Pain Syndrome (Runner's Knee)             What is patellofemoral pain syndrome?   Patellofemoral pain syndrome is pain behind the kneecap. It may also be called patellofemoral disorder, patellar malalignment, runner's knee, and chondromalacia.   How does it occur?   Patellofemoral pain syndrome can occur from overuse of the knee in sports and activities such as running, walking, jumping, or bicycling.   The kneecap (patella) is attached to the large group of muscles in the thigh called the quadriceps. It is also attached to the shin bone by the patellar tendon. The kneecap fits into grooves in the end of the thigh bone (femur) called the femoral condyle. With repeated bending and straightening of the knee, you can irritate the inside surface of the kneecap and cause pain.   Patellofemoral pain syndrome also may result from the way your hips, legs, knees, or feet are aligned. For example, if you have wide hips or underdeveloped thigh muscles, or if you are knock-kneed You may also have this problem if your foot flattens too much when you walk or run (a condition called over-pronation).   What are the symptoms?   The main symptom is pain behind the kneecap. You may have pain when you walk, run, or sit for a long time. The pain is usually worse when you walk downhill or down stairs. Your knee may swell at times. You may feel or hear snapping, popping,  or grinding in the knee.   How is it diagnosed?   Your healthcare provider will review your symptoms and examine your knee. You will have knee X-rays. You may have an MRI to check for damage to the surface of the patella or femur or another injury.   How is it treated?   Treatment includes the following:   Put an ice pack, gel pack, or package of frozen vegetables, wrapped in a cloth on the area every 3 to 4 hours, for up to 20 minutes at a time.   Raise the knee on a pillow when you sit or lie down.   Take an anti-inflammatory medicine such as ibuprofen, or other medicine as directed by your provider. Nonsteroidal anti-inflammatory medicines (NSAIDs) may cause stomach bleeding and other problems. These risks increase with age. Read the label and take as directed. Unless recommended by your healthcare provider, do not take for more than 10 days.   Follow your provider's instructions for doing exercises to help you recover. Your healthcare provider will show you exercises to help decrease the pain behind your kneecap.   If you over-pronate, your healthcare provider may recommend shoe inserts, called orthotics. You can buy orthotics at a pharmacy or athletic shoe store or they can be custom-made.   Use an infrapatellar strap, a strap placed below the kneecap over the patellar tendon.   Wear a neoprene knee sleeve, which will give support to your knee and patella.   While you recover from your injury, you will need to change your sport or activity to one that does not make your condition worse. For example, you may need to bicycle or swim instead of run.   In cases of severe patellofemoral pain syndrome, surgery may be recommended.   How long will the effects last?   Patellofemoral pain often lasts a long time and can come back after symptoms were better for a while. Treatment requires proper rehabilitation exercises that are done regularly.   When can I return to my normal activities?   Everyone recovers from an  injury at a different rate. Return to your activities depends on how soon your knee recovers, not by how many days or weeks it has been since your injury has occurred. In general, the longer you have symptoms before you start treatment, the longer it will take to get better. The goal is to return you to your normal activities as soon as is safely possible. If you return too soon you may worsen your injury.   You may safely return to your normal activities when, starting from the top of the list and progressing to the end, each of the following is true:   Your injured knee can be fully straightened and bent without pain.   Your knee and leg have regained normal strength compared to the uninjured knee and leg.   You are able to walk, bend, and squat without pain.   How can I prevent runner's knee?   Runner's knee can best be prevented by strengthening your thigh muscles, particularly the inside part of this muscle group. It is also important to wear shoes that fit well and that have good arch supports.     Published by [employer].  This content is reviewed periodically and is subject to change as new health information becomes available. The information is intended to inform and educate and is not a replacement for medical evaluation, advice, diagnosis or treatment by a healthcare professional.   Written by Giorgio Masterson MD, for [employer]   ? 2010 [employer] and/or its affiliates. All Rights Reserved.   Copyright   Clinical Reference Systems 2011  Adult Health Advisor    Patellofemoral Pain Syndrome (Runner's Knee) Rehabilitation Exercises              You can do the hamstring stretch right away. When the pain in your knee has decreased, you can do the quadriceps stretch and start strengthening the thigh muscles using the rest of the exercises.   Standing hamstring stretch: Put the heel of the leg on your injured side on a stool about 15 inches high. Keep your leg straight. Lean forward, bending at the hips,  until you feel a mild stretch in the back of your thigh. Make sure you don't roll your shoulders or bend at the waist when doing this or you will stretch your lower back instead of your leg. Hold the stretch for 15 to 30 seconds. Repeat 3 times.   Quadriceps stretch: Stand an arm's length away from the wall with your injured leg farthest from the wall. Facing straight ahead, brace yourself by keeping one hand against the wall. With your other hand, grasp the ankle of your injured leg and pull your heel toward your buttocks. Don't arch or twist your back. Keep your knees together. Hold this stretch for 15 to 30 seconds.   Side-lying leg lift: Lie on your uninjured side. Tighten the front thigh muscles on your injured leg and lift that leg 8 to 10 inches away from the other leg. Keep the leg straight and lower it slowly. Do 3 sets of 10.   Quad sets: Sit on the floor with your injured leg straight and your other leg bent. Press the back of the knee of your injured leg against the floor by tightening the muscles on the top of your thigh. Hold this position 10 seconds. Relax. Do 3 sets of 10.   Straight leg raise: Lie on your back with your legs straight out in front of you. Bend the knee on your uninjured side and place the foot flat on the floor. Tighten the thigh muscle on your injured side and lift your leg about 8 inches off the floor. Keep your leg straight and your thigh muscle tight. Slowly lower your leg back down to the floor. Do 3 sets of 10.   Step-up: Stand with the foot of your injured leg on a support 3 to 5 inches high (like a small step or block of wood). Keep your other foot flat on the floor. Shift your weight onto the injured leg on the support. Straighten your injured leg as the other leg comes off the floor. Return to the starting position by bending your injured leg and slowly lowering your uninjured leg back to the floor. Do 3 sets of 10.   Wall squat with a ball: Stand with your back, shoulders,  and head against a wall. Look straight ahead. Keep your shoulders relaxed and your feet 3 feet from the wall and shoulder's width apart. Place a soccer or basketball-sized ball behind your back. Keeping your back against the wall, slowly squat down to a 45-degree angle. Your thighs will not yet be parallel to the floor. Hold this position for 10 seconds and then slowly slide back up the wall. Repeat 10 times. Build up to 3 sets of 10.   Knee stabilization: Wrap a piece of elastic tubing around the ankle of the uninjured leg. Tie a knot in the other end of the tubing and close it in a door.   0. Stand facing the door on the leg without tubing and bend your knee slightly, keeping your thigh muscles tight. While maintaining this position, move the leg with the tubing straight back behind you. Do 3 sets of 10.   0. Turn 90 degrees so the leg without tubing is closest to the door. Move the leg with tubing away from your body. Do 3 sets of 10.   0. Turn 90 degrees again so your back is to the door. Move the leg with tubing straight out in front of you. Do 3 sets of 10.   0. Turn your body 90 degrees again so the leg with tubing is closest to the door. Move the leg with tubing across your body. Do 3 sets of 10.   Hold onto a chair if you need help balancing. This exercise can be made even more challenging by standing on a pillow while you move the leg with tubing.   Resisted terminal knee extension: Make a loop from a piece of elastic tubing by tying a knot in both ends. Close both knots in a door. Step into the loop so the tubing is around the back of your injured leg. Lift the other foot off the ground. Hold onto a chair for balance, if needed. Bend the knee on the leg with tubing about 45 degrees. Slowly straighten your leg, keeping your thigh muscle tight as you do this. Do this 10 times. Do 3 sets. An easier way to do this is to stand on both legs for better support while you do the exercise.   Standing calf stretch:  Stand facing a wall with your hands on the wall at about eye level. Keep your injured leg back with your heel on the floor. Keep the other leg forward with the knee bent. Turn your back foot slightly inward (as if you were pigeon-toed). Slowly lean into the wall until you feel a stretch in the back of your calf. Hold the stretch for 15 to 30 seconds. Return to the starting position. Repeat 3 times. Do this exercise several times each day.   Clam exercise: Lie on your uninjured side with your hips and knees bent and feet together. Slowly raise your top leg toward the ceiling while keeping your heels touching each other. Hold for 2 seconds and lower slowly. Do 3 sets of 10 repetitions.   Iliotibial band stretch: Side-bending: Cross one leg in front of the other leg and lean in the opposite direction from the front leg. Reach the arm on the side of the back leg over your head while you do this. Hold this position for 15 to 30 seconds. Return to the starting position. Repeat 3 times and then switch legs and repeat the exercise.   Published by [employer].  This content is reviewed periodically and is subject to change as new health information becomes available. The information is intended to inform and educate and is not a replacement for medical evaluation, advice, diagnosis or treatment by a healthcare professional.   Written by Mirtha Prakash MS, PT, and Felicitas Jaimes PT, [facility], [facility], for [employer].   ? 2010 [facility] and/or its affiliates. All Rights Reserved.   Copyright   Clinical Reference Systems 2011  Adult Health Advisor                                     Follow-ups after your visit        Additional Services     ORTHOPEDICS ADULT REFERRAL       Your provider has referred you to: [facility]: [facility] [phone]   [email or web address]    Please be aware that coverage of these services is subject to the terms and limitations of your health insurance  "plan.  Call member services at your health plan with any benefit or coverage questions.      Please bring the following to your appointment:    >>   Any x-rays, CTs or MRIs which have been performed.  Contact the facility where they were done to arrange for  prior to your scheduled appointment.    >>   List of current medications   >>   This referral request   >>   Any documents/labs given to you for this referral                  Who to contact     If you have questions or need follow up information about today's clinic visit or your schedule please contact [facility] [place] directly at [phone].  Normal or non-critical lab and imaging results will be communicated to you by [employer], letter or phone within 4 business days after the clinic has received the results. If you do not hear from us within 7 days, please contact the clinic through [employer] or phone. If you have a critical or abnormal lab result, we will notify you by phone as soon as possible.  Submit refill requests through [employer] or call your pharmacy and they will forward the refill request to us. Please allow 3 business days for your refill to be completed.          Additional Information About Your Visit        MyChart Information     [employer] gives you secure access to your electronic health record. If you see a primary care provider, you can also send messages to your care team and make appointments. If you have questions, please call your primary care clinic.  If you do not have a primary care provider, please call [phone] and they will assist you.        Care EveryWhere ID     This is your Care EveryWhere ID. This could be used by other organizations to access your [facility] medical records  [id number]        Your Vitals Were     Pulse Temperature Respirations Height BMI (Body Mass Index)       64 97.9  F (36.6  C) (Temporal) 16 5' 7.28\" (1.709 m) 30.69 kg/m2        Blood Pressure from Last 3 Encounters:   10/19/18 126/80 "   08/03/18 118/75   10/17/17 126/70    Weight from Last 3 Encounters:   10/19/18 197 lb 9.6 oz (89.6 kg)   08/03/18 198 lb (89.8 kg)   10/17/17 210 lb 12.8 oz (95.6 kg)              We Performed the Following     ORTHOPEDICS ADULT REFERRAL        Primary Care Provider Office Phone # Fax #    Arleth Negro PA-C [phone] [address] [place] [address]        Equal Access to Services     [facility]: Hadii aad ku hadasho Soomaali, waaxda luqadaha, qaybta kaalmada adeegyada, waxshauna solorzano haylindsey bennett . So [facility] [phone].    ATENCIÓN: Si habla español, tiene a ramírez disposición servicios gratuitos de asistencia lingüística. Llame al [phone].    We comply with applicable federal civil rights laws and [place] laws. We do not discriminate on the basis of race, color, national origin, age, disability, sex, sexual orientation, or gender identity.            Thank you!     Thank you for choosing [facility]  for your care. Our goal is always to provide you with excellent care. Hearing back from our patients is one way we can continue to improve our services. Please take a few minutes to complete the written survey that you may receive in the mail after your visit with us. Thank you!             Your Updated Medication List - Protect others around you: Learn how to safely use, store and throw away your medicines at www.disposemymeds.org.          This list is accurate as of 10/19/18 10:00 AM.  Always use your most recent med list.                   Brand Name Dispense Instructions for use Diagnosis    COLD & FLU SEVERE PO           ibuprofen 200 MG capsule           TYLENOL 325 MG tablet   Generic drug:  acetaminophen

## 2018-10-19 NOTE — LETTER
My Depression Action Plan  Name: Maryann Villarreal   Date of Birth 1990  Date: 10/16/2018    My doctor: Arleth Negro   My clinic: [facility]  [id number] [place] [id number] [phone]          GREEN    ZONE   Good Control    What it looks like:     Things are going generally well. You have normal up s and down s. You may even feel depressed from time to time, but bad moods usually last less than a day.   What you need to do:  1. Continue to care for yourself (see self care plan)  2. Check your depression survival kit and update it as needed  3. Follow your physician s recommendations including any medication.  4. Do not stop taking medication unless you consult with your physician first.           YELLOW         ZONE Getting Worse    What it looks like:     Depression is starting to interfere with your life.     It may be hard to get out of bed; you may be starting to isolate yourself from others.    Symptoms of depression are starting to last most all day and this has happened for several days.     You may have suicidal thoughts but they are not constant.   What you need to do:     1. Call your care team, your response to treatment will improve if you keep your care team informed of your progress. Yellow periods are signs an adjustment may need to be made.     2. Continue your self-care, even if you have to fake it!    3. Talk to someone in your support network    4. Open up your depression survival kit           RED    ZONE Medical Alert - Get Help    What it looks like:     Depression is seriously interfering with your life.     You may experience these or other symptoms: You can t get out of bed most days, can t work or engage in other necessary activities, you have trouble taking care of basic hygiene, or basic responsibilities, thoughts of suicide or death that will not go away, self-injurious behavior.     What you need to do:  1. Call your care team and  request a same-day appointment. If they are not available (weekends or after hours) call your local crisis line, emergency room or 911.            Depression Self Care Plan / Survival Kit    Self-Care for Depression  Here s the deal. Your body and mind are really not as separate as most people think.  What you do and think affects how you feel and how you feel influences what you do and think. This means if you do things that people who feel good do, it will help you feel better.  Sometimes this is all it takes.  There is also a place for medication and therapy depending on how severe your depression is, so be sure to consult with your medical provider and/ or Behavioral Health Consultant if your symptoms are worsening or not improving.     In order to better manage my stress, I will:    Exercise  Get some form of exercise, every day. This will help reduce pain and release endorphins, the  feel good  chemicals in your brain. This is almost as good as taking antidepressants!  This is not the same as joining a gym and then never going! (they count on that by the way ) It can be as simple as just going for a walk or doing some gardening, anything that will get you moving.      Hygiene   Maintain good hygiene (Get out of bed in the morning, Make your bed, Brush your teeth, Take a shower, and Get dressed like you were going to work, even if you are unemployed).  If your clothes don't fit try to get ones that do.    Diet  I will strive to eat foods that are good for me, drink plenty of water, and avoid excessive sugar, caffeine, alcohol, and other mood-altering substances.  Some foods that are helpful in depression are: complex carbohydrates, B vitamins, flaxseed, fish or fish oil, fresh fruits and vegetables.    Psychotherapy  I agree to participate in Individual Therapy (if recommended).    Medication  If prescribed medications, I agree to take them.  Missing doses can result in serious side effects.  I understand that  drinking alcohol, or other illicit drug use, may cause potential side effects.  I will not stop my medication abruptly without first discussing it with my provider.    Staying Connected With Others  I will stay in touch with my friends, family members, and my primary care provider/team.    Use your imagination  Be creative.  We all have a creative side; it doesn t matter if it s oil painting, sand castles, or mud pies! This will also kick up the endorphins.    Witness Beauty  (AKA stop and smell the roses) Take a look outside, even in mid-winter. Notice colors, textures. Watch the squirrels and birds.     Service to others  Be of service to others.  There is always someone else in need.  By helping others we can  get out of ourselves  and remember the really important things.  This also provides opportunities for practicing all the other parts of the program.    Humor  Laugh and be silly!  Adjust your TV habits for less news and crime-drama and more comedy.    Control your stress  Try breathing deep, massage therapy, biofeedback, and meditation. Find time to relax each day.     My support system    Clinic Contact:  Phone number:    Contact 1:  Phone number:    Contact 2:  Phone number:    Shinto/:  Phone number:    Therapist:  Phone number:    Local crisis center:    Phone number:    Other community support:  Phone number:

## 2018-10-19 NOTE — PROGRESS NOTES
"ORTHOPEDIC CONSULT        Chief Complaint: Maryann Villarreal is a 28 year old female who works as a  and plays softball.          She is being seen for        Chief Complaints and History of Present Illnesses   Patient presents with     Consult       Right Knee pain         History of Present Illness:   Maryann Villarreal is a 28 year old female who is seen in consultation at the request of Nazario Ovalle.  History of Present illness:  Maryann presents for evaluation of:   1.) Chronic Right knee pain  Onset:  August 2018  Symptoms brought on by Not sure, but started after a playing 8 softball games in a row.   Character:  sharp and dull ache.    Progression of symptoms:  worse.    Previous similar pain: no .   Pain Level:  6/10.   Previous treatments:  heat, ice, support wrap, acetaminophen and Ibuprofen - no help  Currently on Blood thinners? NO  Diagnosis of Diabetes? NO     /74 (BP Location: Right arm, Patient Position: Sitting, Cuff Size: Adult Large)  Temp 98.4  F (36.9  C) (Temporal)  Ht 1.709 m (5' 7.28\")  Wt 89.6 kg (197 lb 9.6 oz)  BMI 30.69 kg/m2  Body mass index is 30.69 kg/(m^2).  5' 7.28\"  197 lbs 9.6 oz     Sahara Cummings, HUDSON     Pain and tenderness over proximal tibia, hurts when it is hit, worse with extending leg and stairs        Patient's past medical, surgical, social and family histories reviewed.          Past Medical History         Past Medical History:   Diagnosis Date     Restless legs syndrome (RLS)       Sprain and strain of other specified sites of knee and leg       shin splints                Past Surgical History    No past surgical history on file.           Medications:     Current Outpatient Prescriptions on File Prior to Visit:  IBUPROFEN 200 MG OR CAPS     Phenylephrine-DM-GG-APAP (COLD & FLU SEVERE PO)     TYLENOL 325 MG OR TABS        No current facility-administered medications on file prior to visit.              Allergies   Allergen Reactions     No Known " "Drug Allergies              Social History          Occupational History     Not on file.              Social History Main Topics      Smoking status: Never Smoker      Smokeless tobacco: Never Used      Alcohol use Yes         Comment: 3-4 times/year      Drug use: No      Sexual activity: No          Comment: never           Patient does not use Tobacco products.         Family History    No family history on file.           REVIEW OF SYSTEMS  10 point review systems performed otherwise negative as noted as per history of present illness.        Physical Exam:  Vitals: /74 (BP Location: Right arm, Patient Position: Sitting, Cuff Size: Adult Large)  Temp 98.4  F (36.9  C) (Temporal)  Ht 1.709 m (5' 7.28\")  Wt 89.6 kg (197 lb 9.6 oz)  BMI 30.69 kg/m2  BMI= Body mass index is 30.69 kg/(m^2).     Constitutional: healthy, alert and no acute distress   Psychiatric: mentation appears normal and affect normal/bright  NEURO: no focal deficits  RESP: Normal with easy respirations and no use of accessory muscles to breathe, no audible wheezing or retractions  CV: regular pulse  SKIN: No erythema, rashes, excoriation, or breakdown. No evidence of infection.   JOINT/EXTREMITIES:right Knee Exam: Inspection: AP/lateral alignment normal, No effusion, No quad atrophy  Tender: patellar tendon insertion  Non-tender: lateral patellar facet, medial patellar facet, inferior pole patella, quadriceps insertion, lateral joint line, medial joint line  Active Range of Motion: pain with flexion  Strength: normal  Special tests: normal Valgus stress test, normal Varus, negative Lachman's test     GAIT: non-antalgic  Lymph: no palpable lymph nodes     Diagnostic Modalities:  right knee X-ray: No fracture, dislocation and or lesion. Normal alignment.  Joint space maintained no significant arthritis. No appreciable soft tissue abnormality  Independent visualization of the images was performed.        Impression: right Patellofemoral " syndrome     Plan:  All of the above pertinent physical exam and imaging modalities findings was reviewed with Maryann.  I would not recommend cortisone injection at her patellar tendon insertion since that could predispose her to a rupture.                                           CONSERVATIVE CARE:  I recommend conservative care for the patient to include NSAIDs, Tylenol, focused self directed physical therapy, activity modifications, bracing , rest. Today I provided or dispensed brace- knee, info and hep.                                                FUTURE PLAN:  On their return if they still have symptoms we will consider physical therapy, MRI, NSAID's.         BP Readings from Last 1 Encounters:   10/19/18 110/74         BP noted to be well controlled today in office.      Return to clinic PRN, or sooner as needed for changes.  Re-x-ray on return: No     Aimee De La Paz M.D.

## 2018-10-19 NOTE — PROGRESS NOTES
"ORTHOPEDIC CONSULT      Chief Complaint: Maryann Villarreal is a 28 year old female who works as a  and plays softball.        She is being seen for   Chief Complaints and History of Present Illnesses   Patient presents with     Consult     Right Knee pain       History of Present Illness:   Maryann Villarreal is a 28 year old female who is seen in consultation at the request of Nazario Ovalle.  History of Present illness:  Maryann presents for evaluation of:   1.) Chronic Right knee pain  Onset:  August 2018  Symptoms brought on by Not sure, but started after a playing 8 softball games in a row.   Character:  sharp and dull ache.    Progression of symptoms:  worse.    Previous similar pain: no .   Pain Level:  6/10.   Previous treatments:  heat, ice, support wrap, acetaminophen and Ibuprofen - no help  Currently on Blood thinners? NO  Diagnosis of Diabetes? NO    /74 (BP Location: Right arm, Patient Position: Sitting, Cuff Size: Adult Large)  Temp 98.4  F (36.9  C) (Temporal)  Ht 1.709 m (5' 7.28\")  Wt 89.6 kg (197 lb 9.6 oz)  BMI 30.69 kg/m2  Body mass index is 30.69 kg/(m^2).  5' 7.28\"  197 lbs 9.6 oz    Sahara Cummings, HUDSON    Pain and tenderness over proximal tibia, hurts when it is hit, worse with extending leg and stairs      Patient's past medical, surgical, social and family histories reviewed.       Past Medical History:   Diagnosis Date     Restless legs syndrome (RLS)      Sprain and strain of other specified sites of knee and leg     shin splints         No past surgical history on file.      Medications:    Current Outpatient Prescriptions on File Prior to Visit:  IBUPROFEN 200 MG OR CAPS    Phenylephrine-DM-GG-APAP (COLD & FLU SEVERE PO)    TYLENOL 325 MG OR TABS      No current facility-administered medications on file prior to visit.       Allergies   Allergen Reactions     No Known Drug Allergies          Social History     Occupational History     Not on file.     Social History Main " "Topics     Smoking status: Never Smoker     Smokeless tobacco: Never Used     Alcohol use Yes      Comment: 3-4 times/year     Drug use: No     Sexual activity: No      Comment: never        Patient does not use Tobacco products.      No family history on file.      REVIEW OF SYSTEMS  10 point review systems performed otherwise negative as noted as per history of present illness.      Physical Exam:  Vitals: /74 (BP Location: Right arm, Patient Position: Sitting, Cuff Size: Adult Large)  Temp 98.4  F (36.9  C) (Temporal)  Ht 1.709 m (5' 7.28\")  Wt 89.6 kg (197 lb 9.6 oz)  BMI 30.69 kg/m2  BMI= Body mass index is 30.69 kg/(m^2).    Constitutional: healthy, alert and no acute distress   Psychiatric: mentation appears normal and affect normal/bright  NEURO: no focal deficits  RESP: Normal with easy respirations and no use of accessory muscles to breathe, no audible wheezing or retractions  CV: regular pulse  SKIN: No erythema, rashes, excoriation, or breakdown. No evidence of infection.   JOINT/EXTREMITIES:right Knee Exam: Inspection: AP/lateral alignment normal, No effusion, No quad atrophy  Tender: patellar tendon insertion  Non-tender: lateral patellar facet, medial patellar facet, inferior pole patella, quadriceps insertion, lateral joint line, medial joint line  Active Range of Motion: pain with flexion  Strength: normal  Special tests: normal Valgus stress test, normal Varus, negative Lachman's test    GAIT: non-antalgic  Lymph: no palpable lymph nodes    Diagnostic Modalities:  right knee X-ray: No fracture, dislocation and or lesion. Normal alignment.  Joint space maintained no significant arthritis. No appreciable soft tissue abnormality  Independent visualization of the images was performed.      Impression: right Patellofemoral syndrome    Plan:  All of the above pertinent physical exam and imaging modalities findings was reviewed with Maryann.  I would not recommend cortisone injection at her " patellar tendon insertion since that could predispose her to a rupture.                                          CONSERVATIVE CARE:  I recommend conservative care for the patient to include NSAIDs, Tylenol, focused self directed physical therapy, activity modifications, bracing , rest. Today I provided or dispensed brace- knee, info and hep.                                                FUTURE PLAN:  On their return if they still have symptoms we will consider physical therapy, MRI, NSAID's.    BP Readings from Last 1 Encounters:   10/19/18 110/74       BP noted to be well controlled today in office.     Return to clinic PRN, or sooner as needed for changes.  Re-x-ray on return: No    Aimee De La Paz M.D.

## 2018-10-19 NOTE — PATIENT INSTRUCTIONS
Understanding Patellofemoral Syndrome    Patellofemoral syndrome is a condition that causes pain on the front of the knee. The large bones of the upper and lower leg meet at the knee. This joint also includes a small triangle-shaped bone that rests on top of the leg bones. This is the kneecap (patella). Patellofemoral refers to the patella and the thigh bone (femur). These bones are surrounded by connective tissue and muscles. Patellofemoral pain is believed to come from stress on the tissues of and around the knee.  What causes patellofemoral syndrome?  No single cause for patellofemoral pain has been found. But many things are likely to contribute to this type of knee pain. These include:    Actions that put repeated stress on the knee, such as running and squatting    Overtraining at a sport    Weak hip or thigh muscle    Normal variations in the way body parts fit together    Poor form during activities that stress the knee, such as running    A fall or blow to the knee  Symptoms of patellofemoral syndrome  Pain is a common symptom. It s often on the front of the knee, but can be around the kneecap. Pain can occur at certain times, such as when you are:    Running    Sitting for a long time with your knees bent, such as at a movie    Walking up or down stairs    Squatting  Other symptoms may include:    A feeling of the knee catching or locking    A grinding or crackling noise in your knee  Treatment for patellofemoral syndrome  Treatment focuses on reducing pain and avoiding further injury. Treatments may include:    Rest your leg. This gives your knee time to recover. You may need to avoid or change the activity that caused the problem, such as not running for a while.    Prescription or over-the-counter pain medicines. These help reduce inflammation, swelling, and pain.    Cold packs. These help reduce pain.    Stretches and other exercises. These can improve balance, flexibility, and strength.    A shoe  insert (orthotic). This can make your knee more stable.    Elastic tape or a brace. These can make your knee more stable.    Physical therapy. This may include exercises or other treatments.    Surgery. In rare cases, if other treatments don t relieve symptoms, you may need surgery.  Possible complications of patellofemoral syndrome  If you don t give your knee time to heal, symptoms may return or get worse. Follow your healthcare provider s instructions on resting and treating your knee.  When to call your healthcare provider  Call your healthcare provider right away if you have any of these:    Fever of 100.4 F (38 C) or higher, or as directed    Pain that gets worse    Symptoms that don t get better, or get worse    New symptoms   Date Last Reviewed: 3/10/2016    2456-1087 [employer]. [address] [place] [address]. All rights reserved. This information is not intended as a substitute for professional medical care. Always follow your healthcare professional's instructions.        Quad Set for Leg and Knee    This exercise is designed to stretch and strengthen your knee. Before beginning, read through all the instructions. While exercising, breathe normally and use smooth movements. If you feel any pain, stop the exercise. If pain persists, call your healthcare provider.  1.  Sit on the floor with one leg straight, the other bent.  2.  Flex the foot of your straight leg by pointing your toes toward you. Press the back of your knee into the floor while tightening the muscle on the top of your thigh. Hold for ______ seconds. Then relax.  3.  Repeat ______ times. Do ______ sets a day.  Caution    Don t arch your back.    Don t hunch your shoulders.  Date Last Reviewed: 9/20/2015 2000-2017 [employer]. [address] [place] [id number]. All rights reserved. This information is not intended as a substitute for professional medical care. Always follow your healthcare  professional's instructions.        Knee Rehabilitation: Straight Leg Raises    Begin your rehabilitation with exercises that develop muscle control. These help you meet basic goals, like driving a car or going back to work. Exercise as often as you re advised. But stop right away if any exercise causes sharp or increasing pain. Icing your knee for 15 to 20 minutes after exercise can help prevent swelling and soreness.    Sit or lie on the floor with your injured leg straight and the other leg bent. Point the toes on your injured leg straight up.    Raise your injured leg a few inches off the floor.    Hold for 10 seconds. Repeat 5 times. Rest for a minute, and then do another set. Do 2 to 3 sessions per day.  Note: Do this exercise with toes turned out to strengthen inner thigh muscles.  Date Last Reviewed: 8/16/2015 2000-2017 [employer]. [address] [place] [id number]. All rights reserved. This information is not intended as a substitute for professional medical care. Always follow your healthcare professional's instructions.

## 2018-10-19 NOTE — PATIENT INSTRUCTIONS
Patellofemoral Pain Syndrome (Runner's Knee)             What is patellofemoral pain syndrome?   Patellofemoral pain syndrome is pain behind the kneecap. It may also be called patellofemoral disorder, patellar malalignment, runner's knee, and chondromalacia.   How does it occur?   Patellofemoral pain syndrome can occur from overuse of the knee in sports and activities such as running, walking, jumping, or bicycling.   The kneecap (patella) is attached to the large group of muscles in the thigh called the quadriceps. It is also attached to the shin bone by the patellar tendon. The kneecap fits into grooves in the end of the thigh bone (femur) called the femoral condyle. With repeated bending and straightening of the knee, you can irritate the inside surface of the kneecap and cause pain.   Patellofemoral pain syndrome also may result from the way your hips, legs, knees, or feet are aligned. For example, if you have wide hips or underdeveloped thigh muscles, or if you are knock-kneed You may also have this problem if your foot flattens too much when you walk or run (a condition called over-pronation).   What are the symptoms?   The main symptom is pain behind the kneecap. You may have pain when you walk, run, or sit for a long time. The pain is usually worse when you walk downhill or down stairs. Your knee may swell at times. You may feel or hear snapping, popping, or grinding in the knee.   How is it diagnosed?   Your healthcare provider will review your symptoms and examine your knee. You will have knee X-rays. You may have an MRI to check for damage to the surface of the patella or femur or another injury.   How is it treated?   Treatment includes the following:   Put an ice pack, gel pack, or package of frozen vegetables, wrapped in a cloth on the area every 3 to 4 hours, for up to 20 minutes at a time.   Raise the knee on a pillow when you sit or lie down.   Take an anti-inflammatory medicine such  as ibuprofen, or other medicine as directed by your provider. Nonsteroidal anti-inflammatory medicines (NSAIDs) may cause stomach bleeding and other problems. These risks increase with age. Read the label and take as directed. Unless recommended by your healthcare provider, do not take for more than 10 days.   Follow your provider's instructions for doing exercises to help you recover. Your healthcare provider will show you exercises to help decrease the pain behind your kneecap.   If you over-pronate, your healthcare provider may recommend shoe inserts, called orthotics. You can buy orthotics at a pharmacy or athletic shoe store or they can be custom-made.   Use an infrapatellar strap, a strap placed below the kneecap over the patellar tendon.   Wear a neoprene knee sleeve, which will give support to your knee and patella.   While you recover from your injury, you will need to change your sport or activity to one that does not make your condition worse. For example, you may need to bicycle or swim instead of run.   In cases of severe patellofemoral pain syndrome, surgery may be recommended.   How long will the effects last?   Patellofemoral pain often lasts a long time and can come back after symptoms were better for a while. Treatment requires proper rehabilitation exercises that are done regularly.   When can I return to my normal activities?   Everyone recovers from an injury at a different rate. Return to your activities depends on how soon your knee recovers, not by how many days or weeks it has been since your injury has occurred. In general, the longer you have symptoms before you start treatment, the longer it will take to get better. The goal is to return you to your normal activities as soon as is safely possible. If you return too soon you may worsen your injury.   You may safely return to your normal activities when, starting from the top of the list and progressing to the end, each of the following is  true:   Your injured knee can be fully straightened and bent without pain.   Your knee and leg have regained normal strength compared to the uninjured knee and leg.   You are able to walk, bend, and squat without pain.   How can I prevent runner's knee?   Runner's knee can best be prevented by strengthening your thigh muscles, particularly the inside part of this muscle group. It is also important to wear shoes that fit well and that have good arch supports.     Published by [employer].  This content is reviewed periodically and is subject to change as new health information becomes available. The information is intended to inform and educate and is not a replacement for medical evaluation, advice, diagnosis or treatment by a healthcare professional.   Written by Giorgio Masterson MD, for [employer]   ? 2010 [facility] and/or its affiliates. All Rights Reserved.   Copyright   [employer] Reference Systems 2011  Adult Health Advisor    Patellofemoral Pain Syndrome (Runner's Knee) Rehabilitation Exercises              You can do the hamstring stretch right away. When the pain in your knee has decreased, you can do the quadriceps stretch and start strengthening the thigh muscles using the rest of the exercises.   Standing hamstring stretch: Put the heel of the leg on your injured side on a stool about 15 inches high. Keep your leg straight. Lean forward, bending at the hips, until you feel a mild stretch in the back of your thigh. Make sure you don't roll your shoulders or bend at the waist when doing this or you will stretch your lower back instead of your leg. Hold the stretch for 15 to 30 seconds. Repeat 3 times.   Quadriceps stretch: Stand an arm's length away from the wall with your injured leg farthest from the wall. Facing straight ahead, brace yourself by keeping one hand against the wall. With your other hand, grasp the ankle of your injured leg and pull your heel toward your buttocks. Don't arch or twist your  back. Keep your knees together. Hold this stretch for 15 to 30 seconds.   Side-lying leg lift: Lie on your uninjured side. Tighten the front thigh muscles on your injured leg and lift that leg 8 to 10 inches away from the other leg. Keep the leg straight and lower it slowly. Do 3 sets of 10.   Quad sets: Sit on the floor with your injured leg straight and your other leg bent. Press the back of the knee of your injured leg against the floor by tightening the muscles on the top of your thigh. Hold this position 10 seconds. Relax. Do 3 sets of 10.   Straight leg raise: Lie on your back with your legs straight out in front of you. Bend the knee on your uninjured side and place the foot flat on the floor. Tighten the thigh muscle on your injured side and lift your leg about 8 inches off the floor. Keep your leg straight and your thigh muscle tight. Slowly lower your leg back down to the floor. Do 3 sets of 10.   Step-up: Stand with the foot of your injured leg on a support 3 to 5 inches high (like a small step or block of wood). Keep your other foot flat on the floor. Shift your weight onto the injured leg on the support. Straighten your injured leg as the other leg comes off the floor. Return to the starting position by bending your injured leg and slowly lowering your uninjured leg back to the floor. Do 3 sets of 10.   Wall squat with a ball: Stand with your back, shoulders, and head against a wall. Look straight ahead. Keep your shoulders relaxed and your feet 3 feet from the wall and shoulder's width apart. Place a soccer or basketball-sized ball behind your back. Keeping your back against the wall, slowly squat down to a 45-degree angle. Your thighs will not yet be parallel to the floor. Hold this position for 10 seconds and then slowly slide back up the wall. Repeat 10 times. Build up to 3 sets of 10.   Knee stabilization: Wrap a piece of elastic tubing around the ankle of the uninjured leg. Tie a knot in the other  end of the tubing and close it in a door.   0. Stand facing the door on the leg without tubing and bend your knee slightly, keeping your thigh muscles tight. While maintaining this position, move the leg with the tubing straight back behind you. Do 3 sets of 10.   0. Turn 90 degrees so the leg without tubing is closest to the door. Move the leg with tubing away from your body. Do 3 sets of 10.   0. Turn 90 degrees again so your back is to the door. Move the leg with tubing straight out in front of you. Do 3 sets of 10.   0. Turn your body 90 degrees again so the leg with tubing is closest to the door. Move the leg with tubing across your body. Do 3 sets of 10.   Hold onto a chair if you need help balancing. This exercise can be made even more challenging by standing on a pillow while you move the leg with tubing.   Resisted terminal knee extension: Make a loop from a piece of elastic tubing by tying a knot in both ends. Close both knots in a door. Step into the loop so the tubing is around the back of your injured leg. Lift the other foot off the ground. Hold onto a chair for balance, if needed. Bend the knee on the leg with tubing about 45 degrees. Slowly straighten your leg, keeping your thigh muscle tight as you do this. Do this 10 times. Do 3 sets. An easier way to do this is to stand on both legs for better support while you do the exercise.   Standing calf stretch: Stand facing a wall with your hands on the wall at about eye level. Keep your injured leg back with your heel on the floor. Keep the other leg forward with the knee bent. Turn your back foot slightly inward (as if you were pigeon-toed). Slowly lean into the wall until you feel a stretch in the back of your calf. Hold the stretch for 15 to 30 seconds. Return to the starting position. Repeat 3 times. Do this exercise several times each day.   Clam exercise: Lie on your uninjured side with your hips and knees bent and feet together. Slowly raise your  top leg toward the ceiling while keeping your heels touching each other. Hold for 2 seconds and lower slowly. Do 3 sets of 10 repetitions.   Iliotibial band stretch: Side-bending: Cross one leg in front of the other leg and lean in the opposite direction from the front leg. Reach the arm on the side of the back leg over your head while you do this. Hold this position for 15 to 30 seconds. Return to the starting position. Repeat 3 times and then switch legs and repeat the exercise.   Published by [employer].  This content is reviewed periodically and is subject to change as new health information becomes available. The information is intended to inform and educate and is not a replacement for medical evaluation, advice, diagnosis or treatment by a healthcare professional.   Written by Mirtha Prakash MS, PT, and Felicitas Jaimes PT, [facility], hospitals, for [employer].   ? 2010 [facility] and/or its affiliates. All Rights Reserved.   Copyright   Clinical Reference Systems 2011  Adult Health Advisor

## 2018-10-20 ASSESSMENT — PATIENT HEALTH QUESTIONNAIRE - PHQ9: SUM OF ALL RESPONSES TO PHQ QUESTIONS 1-9: 5

## 2018-10-20 ASSESSMENT — ANXIETY QUESTIONNAIRES: GAD7 TOTAL SCORE: 8

## 2019-01-14 ENCOUNTER — OFFICE VISIT (OUTPATIENT)
Dept: FAMILY MEDICINE | Facility: OTHER | Age: 29
End: 2019-01-14
Payer: COMMERCIAL

## 2019-01-14 VITALS
TEMPERATURE: 99.6 F | SYSTOLIC BLOOD PRESSURE: 118 MMHG | HEART RATE: 72 BPM | DIASTOLIC BLOOD PRESSURE: 70 MMHG | RESPIRATION RATE: 16 BRPM

## 2019-01-14 DIAGNOSIS — S61.011A THUMB LACERATION, RIGHT, INITIAL ENCOUNTER: Primary | ICD-10-CM

## 2019-01-14 PROCEDURE — 99213 OFFICE O/P EST LOW 20 MIN: CPT | Performed by: PHYSICIAN ASSISTANT

## 2019-01-14 RX ORDER — CEPHALEXIN 500 MG/1
500 CAPSULE ORAL 2 TIMES DAILY
Qty: 20 CAPSULE | Refills: 0 | Status: SHIPPED | OUTPATIENT
Start: 2019-01-14 | End: 2019-01-24

## 2019-01-14 NOTE — PATIENT INSTRUCTIONS
Wound Closure and Wound Care  What is wound closure?   Wounds heal more quickly and with less risk of infection and scarring when the wound is cleaned and the wound edges are held together (closed). Scrapes, scratches, puncture wounds, and shallow cuts may need only cleaning, ointment, and a bandage. Some cuts may need to be closed with tape strips called Steri-Strips or tissue adhesive liquid (skin glue). If a cut or surgical incision is deep, very long, jagged, or under a lot of tension (such as a cut over a joint), stitches (also called sutures) or staples may be needed to close the wound.   How do I take care of my wound and sutures?   If you get an accidental cut, put pressure on the wound with a gauze pad or clean cloth right away to stop the bleeding. Then gently but thoroughly wash it with soap and cool water. Soapy water can be used around, but not in the cut. Try to remove all dirt and debris but do not scrub vigorously. If you decide to get medical treatment, cover the wound and apply pressure as needed to control bleeding while traveling to your healthcare provider's office, urgent care clinic, or emergency room.   After a wound is closed by your healthcare provider, the wound and the area around it must be kept clean and dry. The care of a stapled wound is similar to the care of a sutured wound. There are minor differences in caring for a wound closed with skin glue.   Do not let a wound closed with stitches or staples get wet for the first 24 hours. After 24 hours, you can shower or you can clean the wound with hydrogen peroxide or gently wash it with soap and warm water twice a day.   If your wound was closed with skin glue, keep the wound dry for the first 4 hours after the skin glue was put on. After the first 4 hours, you may occasionally and briefly wet the wound in the shower. You can clean the wound with hydrogen peroxide or gently wash it with soap and water twice a day.   If your  "wound is closed with Steri-Strips, they may be more likely to separate if they get wet. Keep them dry for the first few days while you're in the shower or bath.   Do not soak or scrub the wound. Do not take a bath, go swimming, or use a hot tub.   If recommended by your healthcare provider, you may put a small amount of antibiotic ointment on the wound each time you clean it. This can prevent infection. It will also help keep bandages from sticking to the wound. If a rash appears, stop using the ointment. If your wound is closed with skin glue, do not put liquid, antibiotic ointment, or any other product on the wound while the adhesive is in place. It may loosen the film before the wound is healed.   Make sure the wound is kept dry between washings. After showering or bathing, gently pat the wound dry with a soft towel.   Your healthcare provider may recommend that you cover the wound with gauze or a new, bandage to keep it from getting dirty. Be sure to keep the bandage dry. Put on a new bandage after cleaning the wound of if the old one gets dirty or wet.   Your healthcare provider may recommend leaving the wound \"open to air\" and not covered by a bandage while you sleep to help speed up the healing process. If the wound was closed with skin glue, you do not need a bandage.   For the first 1 or 2 days keep the area propped up higher than your heart. This will help lessen your pain and any swelling.   Protect the wound from repeat injury until the skin has had time to heal.   Protect the wound from a lot of exposure to sunlight or tanning lamps while skin glue is in place. Wounds exposed to the sun can become red, while scars that have not been exposed to the sun usually turn white after a period of time.   Do not scratch, rub, or pick at your stitches, staples, or skin glue. This may cause them to loosen before the wound is healed.   Avoid activities that will make you sweat a lot until the skin glue has naturally " fallen off or the stitches or staples have been removed.   Any wound can become infected. When you are cleaning your wound, look for these signs of infection:   increased redness   red streaks   increased swelling   increased pain or tenderness   pus or other drainage   warmth in the area of the wound   fever.   Contact your provider if you see any signs of infection.   If your wound was accidental, be sure to ask if a tetanus booster is needed. Treatment of accidental wounds may include taking an oral antibiotic to help prevent infection. Be sure to take the medicine until it is completely gone. Do not stop taking it just because the wound looks like it is healing well.   When are stitches, staples, or other types of wound closures removed?   Steri-Strips are usually left on until they fall off. If they have not fallen off after 2 weeks, they should be removed. Skin glue usually falls off on its own in 5 to 10 days.   For deep cuts the first stitches are placed under the skin. These stitches are made of materials that dissolve and do not need to be removed. Sutures or staples on the surface of the skin need to be removed by your healthcare provider 5 to 14 days after they are put in. The length of time depends on where the cut is. Sutures in wounds on the face usually can be removed after 5 to 7 days. In areas of high stress, such as hands, knees, or elbows, the sutures must stay in 10 to 14 days. Your provider will tell you when you should come to the office for removal of your sutures or staples. Do NOT remove sutures or staples yourself unless your provider instructs you to do so. Staples are removed using a special tool. If you don't have the tool, don't try to remove the staples.   When should I call my healthcare provider?   Some swelling, redness, and pain are common with all wounds and normally go away as the wound heals.   Call your provider right away if:   You start to have any signs or symptoms of  infection. These include:   Your skin is redder or more painful.   You have red streaks from the wound going toward your heart.   The wound area is very warm to touch.   You have pus or other fluid coming from the wound area.   You have a fever higher than 101.5? F (38.6? C).   You have chills, nausea, vomiting, or muscle aches.   The wound seems to be opening up or you notice any drainage.   The wound bleeds for more than 10 minutes.   The stitches or staples are loose.   The skin glue is loosening before it is supposed to.   You have any symptoms that worry you.     Published by [employer].  This content is reviewed periodically and is subject to change as new health information becomes available. The information is intended to inform and educate and is not a replacement for medical evaluation, advice, diagnosis or treatment by a healthcare professional.   Written by Giorgio Masterson MD.   ? 2010 [employer] and/or its affiliates. All Rights Reserved.   Copyright   [employer] 2011

## 2019-05-03 ENCOUNTER — TRANSFERRED RECORDS (OUTPATIENT)
Dept: HEALTH INFORMATION MANAGEMENT | Facility: CLINIC | Age: 29
End: 2019-05-03

## 2019-06-04 PROBLEM — M25.512 LEFT SHOULDER PAIN: Status: RESOLVED | Noted: 2018-08-24 | Resolved: 2019-06-04

## 2019-06-04 NOTE — PROGRESS NOTES
Discharge Note    Progress reporting period is from initial evaluation date (please see noted date below) to Sep 7, 2018.  Linked Episodes   Type: Episode: Status: Noted: Resolved: Last update: Updated by:   PHYSICAL THERAPY Left shoulder pain Active 8/24/2018 9/7/2018 11:42 AM Tate Ken      Comments:       Maryann failed to follow up and current status is unknown.  Please see information below for last relevant information on current status.  Patient seen for 3 visits.    SUBJECTIVE  Subjective changes noted by patient:  Been able to throw further, able to throw the mens ball more without being limited by pain. 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  .  Current pain level is 2/10(worse 5/10 while throwing).     Previous pain level was  2/10.   Changes in function:  Yes (See Goal flowsheet attached for changes in current functional level)  Adverse reaction to treatment or activity: None    OBJECTIVE  Changes noted in objective findings: left shoulder - full can, + empty can, no pain with resisted external rotation     ASSESSMENT/PLAN  Diagnosis: Left shoulder pain   Updated problem list and treatment plan:   Pain - HEP  Decreased function - HEP  STG/LTGs have been met or progress has been made towards goals:  Yes, please see goal flowsheet for most current information  Assessment of Progress: current status is unknown.    Last current status: Pt is progressing as expected   Self Management Plans:  HEP  I have re-evaluated this patient and find that the nature, scope, duration and intensity of the therapy is appropriate for the medical condition of the patient.  Maryann continues to require the following intervention to meet STG and LTG's:  HEP.    Recommendations:  Discharge with current home program.  Patient to follow up with MD as needed.    Please refer to the daily flowsheet for treatment today, total treatment time and time spent performing 1:1 timed codes.    Shashank Ascencio,PT, DPT, OCS

## 2019-07-24 ENCOUNTER — TELEPHONE (OUTPATIENT)
Dept: FAMILY MEDICINE | Facility: OTHER | Age: 29
End: 2019-07-24

## 2019-12-08 ENCOUNTER — HEALTH MAINTENANCE LETTER (OUTPATIENT)
Age: 29
End: 2019-12-08

## 2020-03-15 ENCOUNTER — HEALTH MAINTENANCE LETTER (OUTPATIENT)
Age: 30
End: 2020-03-15

## 2020-09-24 NOTE — PROGRESS NOTES
Subjective     Maryann Villarreal is a 30 year old female who presents to clinic today for the following health issues:    HPI     Answers for HPI/ROS submitted by the patient on 9/25/2020   If you checked off any problems, how difficult have these problems made it for you to do your work, take care of things at home, or get along with other people?: Somewhat difficult  PHQ9 TOTAL SCORE: 10  DEMIAN 7 TOTAL SCORE: 8    Back Pain  Onset/Duration: 1 year  Description:   Location of pain: low back bilateral  Character of pain: sharp, dull ache and stabbing  Pain radiation: none  New numbness or weakness in legs, not attributed to pain: no   Intensity: moderate  Progression of Symptoms: same  History:   Specific cause: family genes  Pain interferes with job: YES  History of back problems: recurrent self limited episodes of low back pain in the past  Any previous MRI or X-rays: None  Sees a specialist for back pain: No  Alleviating factors:   Improved by: none    Precipitating factors:  Worsened by: Lifting, Bending, Standing and Walking  Therapies tried and outcome: chiropractor    Accompanying Signs & Symptoms:  Risk of Fracture: None  Risk of Cauda Equina: None  Risk of Infection: None  Risk of Cancer: None  Risk of Ankylosing Spondylitis: Onset at age <35, male, AND morning back stiffness  no     Review of Systems   Constitutional, HEENT, cardiovascular, pulmonary, GI, , musculoskeletal, neuro, skin, endocrine and psych systems are negative, except as otherwise noted.      Objective    /62   Pulse 69   Temp 98  F (36.7  C) (Temporal)   Resp 16   Wt 88.5 kg (195 lb)   SpO2 98%   BMI 30.29 kg/m    Body mass index is 30.29 kg/m .  Physical Exam   GENERAL: healthy, alert and no distress  RESP: lungs clear to auscultation - no rales, rhonchi or wheezes  CV: regular rate and rhythm, normal S1 S2, no S3 or S4, no murmur, click or rub, no peripheral edema and peripheral pulses strong  MS: no gross musculoskeletal  defects noted, no edema  SKIN: no suspicious lesions or rashes to visible skin today.  NEURO: Normal strength and tone, mentation intact and speech normal  Comprehensive back pain exam:  Tenderness of Muscles related to the lumbar spine about L4-L5 region., Pain limits the following motions: Flexion and extension are difficult., Lower extremity strength functional and equal on both sides, Lower extremity reflexes within normal limits bilaterally, Lower extremity sensation normal and equal on both sides and Straight leg raise negative bilaterally  PSYCH: affect flat and fatigued    No results found for this or any previous visit (from the past 24 hour(s)).        Assessment & Plan     1. Lumbar back pain  Ongoing problem as noted.  Review of x-ray from 2017 shows minimal concerns at this point in time.  She does not have radicular pain today on exam.  I suspect that this is all muscle spasm based on physical exam and report.  Treat as noted below and seek physical therapy for further evaluation and treatment options.  Should she develop radicular pain then another x-ray and MRI would be in order to evaluate for further treatment options.  - methylPREDNISolone (MEDROL DOSEPAK) 4 MG tablet therapy pack; Follow Package Directions  Dispense: 21 tablet; Refill: 0  - cyclobenzaprine (FLEXERIL) 10 MG tablet; Take 1 tablet (10 mg) by mouth 3 times daily as needed for muscle spasms ( careful use as this can cause fatigue)  Dispense: 30 tablet; Refill: 0  - meloxicam (MOBIC) 15 MG tablet; Take 1 tablet (15 mg) by mouth daily  Dispense: 90 tablet; Refill: 1  - PHYSICAL THERAPY REFERRAL; Future    2. Screening for HIV (human immunodeficiency virus)  3. Screening for malignant neoplasm of cervix  Advised that she is overdue for female physical and evaluation and she readily admits that she is overdue for lots of things.    Return in about 3 weeks (around 10/16/2020) for recheck of current condition, if symptoms do not  improve.    Nazario Evans PA-C  [facility]

## 2020-09-25 ENCOUNTER — OFFICE VISIT (OUTPATIENT)
Dept: FAMILY MEDICINE | Facility: OTHER | Age: 30
End: 2020-09-25
Payer: COMMERCIAL

## 2020-09-25 VITALS
DIASTOLIC BLOOD PRESSURE: 62 MMHG | TEMPERATURE: 98 F | BODY MASS INDEX: 30.29 KG/M2 | SYSTOLIC BLOOD PRESSURE: 108 MMHG | HEART RATE: 69 BPM | OXYGEN SATURATION: 98 % | WEIGHT: 195 LBS | RESPIRATION RATE: 16 BRPM

## 2020-09-25 DIAGNOSIS — M54.50 LUMBAR BACK PAIN: Primary | ICD-10-CM

## 2020-09-25 DIAGNOSIS — Z11.4 SCREENING FOR HIV (HUMAN IMMUNODEFICIENCY VIRUS): ICD-10-CM

## 2020-09-25 DIAGNOSIS — Z12.4 SCREENING FOR MALIGNANT NEOPLASM OF CERVIX: ICD-10-CM

## 2020-09-25 PROCEDURE — 99214 OFFICE O/P EST MOD 30 MIN: CPT | Performed by: PHYSICIAN ASSISTANT

## 2020-09-25 RX ORDER — CYCLOBENZAPRINE HCL 10 MG
10 TABLET ORAL 3 TIMES DAILY PRN
Qty: 30 TABLET | Refills: 0 | Status: SHIPPED | OUTPATIENT
Start: 2020-09-25 | End: 2020-10-05

## 2020-09-25 RX ORDER — MELOXICAM 15 MG/1
15 TABLET ORAL DAILY
Qty: 90 TABLET | Refills: 1 | Status: SHIPPED | OUTPATIENT
Start: 2020-09-25

## 2020-09-25 RX ORDER — METHYLPREDNISOLONE 4 MG
TABLET, DOSE PACK ORAL
Qty: 21 TABLET | Refills: 0 | Status: SHIPPED | OUTPATIENT
Start: 2020-09-25

## 2020-09-25 ASSESSMENT — PATIENT HEALTH QUESTIONNAIRE - PHQ9
SUM OF ALL RESPONSES TO PHQ QUESTIONS 1-9: 10
10. IF YOU CHECKED OFF ANY PROBLEMS, HOW DIFFICULT HAVE THESE PROBLEMS MADE IT FOR YOU TO DO YOUR WORK, TAKE CARE OF THINGS AT HOME, OR GET ALONG WITH OTHER PEOPLE: SOMEWHAT DIFFICULT
SUM OF ALL RESPONSES TO PHQ QUESTIONS 1-9: 10

## 2020-09-25 ASSESSMENT — ANXIETY QUESTIONNAIRES
GAD7 TOTAL SCORE: 8
GAD7 TOTAL SCORE: 8
7. FEELING AFRAID AS IF SOMETHING AWFUL MIGHT HAPPEN: NOT AT ALL
6. BECOMING EASILY ANNOYED OR IRRITABLE: NEARLY EVERY DAY
3. WORRYING TOO MUCH ABOUT DIFFERENT THINGS: SEVERAL DAYS
7. FEELING AFRAID AS IF SOMETHING AWFUL MIGHT HAPPEN: NOT AT ALL
1. FEELING NERVOUS, ANXIOUS, OR ON EDGE: MORE THAN HALF THE DAYS
4. TROUBLE RELAXING: SEVERAL DAYS
GAD7 TOTAL SCORE: 8
5. BEING SO RESTLESS THAT IT IS HARD TO SIT STILL: NOT AT ALL
2. NOT BEING ABLE TO STOP OR CONTROL WORRYING: SEVERAL DAYS

## 2020-09-25 NOTE — PATIENT INSTRUCTIONS
Patient Education     Back Exercises: Abdominal Lift Brace with Marching    The abdominal lift brace with march strengthens your lower abdominal muscles, helping you keep your pelvis and back stable:    Lie on the floor with both knees bent. Put your feet flat on the floor and your arms by your sides. Tighten your abdominal muscles. Be sure to continue to breathe.    Lift one bent knee about 2 inches then return it to the floor and lift the other about 2 inches. Keep your abdominal muscles tight and continue to breathe. These motions should be slow and controlled without your pelvis rocking side to side.    Repeat 10 times.  Date Last Reviewed: 3/1/2018    [id number] [employer]. [address] [place] [address]. All rights reserved. This information is not intended as a substitute for professional medical care. Always follow your healthcare professional's instructions.           Patient Education     Back Exercises: Arm Reach    Do this exercise on your hands and knees. Keep your knees under your hips and your hands under your shoulders. Keep your spine in a neutral position (not arched or sagging). Be sure to maintain your neck s natural curve:    Stretch one arm straight out in front of you. Don t raise your head or let your supporting shoulder sag.    Hold for 5 seconds.    Return to starting position.    Repeat 5 times.    Switch arms.  Date Last Reviewed: 3/1/2018    [id number] [employer]. [address] [place] [address]. All rights reserved. This information is not intended as a substitute for professional medical care. Always follow your healthcare professional's instructions.           Patient Education     Back Exercises: Back Press    Do this exercise on your hands and knees. Keep your knees under your hips and your hands under your shoulders. Keep your spine in a neutral position (not arched or sagging). Be sure to maintain your neck s natural  curve:    Tighten your stomach and buttock muscles to press your back upward. Let your head drop slightly.    Hold for 5 seconds. Return to starting position.    Repeat 5 times.  Date Last Reviewed: 3/1/2018    4302-3598 [employer]. [address] [place] [address]. All rights reserved. This information is not intended as a substitute for professional medical care. Always follow your healthcare professional's instructions.           Patient Education     Back Exercises: Back Release  Do this exercise on your hands and knees. Keep your knees under your hips and your hands under your shoulders.        Relax your abdominal and buttocks muscles, lift your head, and let your back sag. Be sure to keep your weight evenly distributed. Don t sit back on your hips.     Hold for 5 seconds.    Return to starting position.    Tuck your head and lift (arch) your back.    Hold for 5 seconds    Return to starting position.    Repeat 5 times.  Date Last Reviewed: 3/1/2018    6510-7256 [employer]. [address] [place] [address]. All rights reserved. This information is not intended as a substitute for professional medical care. Always follow your healthcare professional's instructions.           Patient Education     Back Exercises: Hip Rotator Stretch    To start, lie on your back with your knees bent and feet flat on the floor. Don t press your neck or lower back to the floor. Breathe deeply. You should feel comfortable and relaxed in this position.    Rest your right ankle on your left knee.    Place a towel behind your left thigh, and use it to pull the knee toward your chest. Feel the stretch in your buttocks.    Hold for 30 to 60 seconds. Release.    Repeat 2 times.    Switch legs.     If there is any pain other than stretch in the knee or buttock, stop and contact your healthcare provider.  For your safety, check with your healthcare provider before starting an exercise  program.  Date Last Reviewed: 3/1/2018    5158-2314 [employer]. [address] [place] [address]. All rights reserved. This information is not intended as a substitute for professional medical care. Always follow your healthcare professional's instructions.           Patient Education     Back Exercises: Knee Lift         To start, lie on your back with your knees bent and feet flat on the floor. Don t press your neck or lower back to the floor. Breathe deeply. You should feel comfortable and relaxed in this position:    Start by tightening your abdominal muscles.    Lift one bent knee off the floor 2 to 4 inches.    Hold for 10 seconds. Return to start position.    Repeat 3 times.    Switch legs.  Date Last Reviewed: 3/1/2018    [id number] [employer]. [address] [place] [address]. All rights reserved. This information is not intended as a substitute for professional medical care. Always follow your healthcare professional's instructions.           Patient Education     Back Exercises: Leg Pull    To start, lie on your back with your knees bent and feet flat on the floor. Don t press your neck or lower back to the floor. Breathe deeply. You should feel comfortable and relaxed in this position.    Pull one knee to your chest.    Hold for 30 to 60 seconds. Return to starting position.    Repeat 2 times.    Switch legs.    For a double leg pull, pull both legs to your chest at the same time. Repeat 2 times.  For your safety, check with your healthcare provider before starting an exercise program.  Date Last Reviewed: 3/1/2018    [id number] [employer]. [address] [place] [address]. All rights reserved. This information is not intended as a substitute for professional medical care. Always follow your healthcare professional's instructions.           Patient Education     Back Exercises: Leg Reach      Do this exercise on your hands and knees.  Keep your knees under your hips and your hands under your shoulders. Keep your spine in a neutral position (not arched or sagging). Be sure to maintain your neck s natural curve:    Extend one leg straight back. Don t arch your back or let your head or body sag.    Hold for 5 seconds. Return to starting position.    Repeat 5 times.    Switch legs.   Date Last Reviewed: 3/1/2018    [id number] [employer]. [address] [place] [address]. All rights reserved. This information is not intended as a substitute for professional medical care. Always follow your healthcare professional's instructions.           Patient Education     Back Exercises: Lower Back Rotation    To start, lie on your back with your knees bent and feet flat on the floor. Don t press your neck or lower back to the floor. Breathe deeply. You should feel comfortable and relaxed in this position.    Drop both knees to one side. Turn your head to the other side. Keep your shoulders flat on the floor.    Do not push through pain.    Hold for 20 seconds.    Slowly switch sides.    Repeat 2 to 5 times.  Date Last Reviewed: 3/1/2018    3661-6391 [employer]. [address] [place] [address]. All rights reserved. This information is not intended as a substitute for professional medical care. Always follow your healthcare professional's instructions.           Patient Education     Back Exercises: Lower Back Stretch    To start, sit in a chair with your feet flat on the floor. Shift your weight slightly forward. Relax, and keep your ears, shoulders, and hips aligned while you do the following:    Sit with your feet well apart.    Bend forward and touch the floor with the backs of your hands. Relax and let your body drop.    Hold for 20 seconds. Return to starting position.    Repeat 2 times.   Date Last Reviewed: 11/1/2017 2000-2019 [employer]. [address] [place] [address]. All rights  reserved. This information is not intended as a substitute for professional medical care. Always follow your healthcare professional's instructions.           Patient Education     Back Exercises: Partial Curl-Ups    To start, lie on your back with your knees bent and feet flat on the floor. Don t press your neck or lower back to the floor. Breathe deeply. You should feel comfortable and relaxed in this position:    Cross your arms loosely.    Tighten your abdomen and curl assisted up, keeping your head in line with your shoulders.    Hold for 5 seconds. Uncurl to lie down.    Repeat 2 sets of 10.   Date Last Reviewed: 3/1/2018    0888-4999 [employer]. [address] [place] [address]. All rights reserved. This information is not intended as a substitute for professional medical care. Always follow your healthcare professional's instructions.           Patient Education     Back Exercises: Pelvic Tilt    To start, lie on your back with your knees bent and feet flat on the floor. Don t press your neck or lower back to the floor. Breathe deeply. You should feel comfortable and relaxed in this position:    Tighten your stomach and buttocks, and press your lower back toward the floor. This should be a small, subtle movement. This should not increase your pain.    Hold for 5 to 15 seconds. Release.    Repeat 2 to 5 times.  Date Last Reviewed: 3/1/2018    [id number] [employer]. [address] [place] [address]. All rights reserved. This information is not intended as a substitute for professional medical care. Always follow your healthcare professional's instructions.

## 2020-09-26 ASSESSMENT — PATIENT HEALTH QUESTIONNAIRE - PHQ9: SUM OF ALL RESPONSES TO PHQ QUESTIONS 1-9: 10

## 2020-09-26 ASSESSMENT — ANXIETY QUESTIONNAIRES: GAD7 TOTAL SCORE: 8

## 2021-01-10 ENCOUNTER — HEALTH MAINTENANCE LETTER (OUTPATIENT)
Age: 31
End: 2021-01-10

## 2021-10-23 ENCOUNTER — HEALTH MAINTENANCE LETTER (OUTPATIENT)
Age: 31
End: 2021-10-23

## 2022-02-12 ENCOUNTER — HEALTH MAINTENANCE LETTER (OUTPATIENT)
Age: 32
End: 2022-02-12

## 2022-10-09 ENCOUNTER — HEALTH MAINTENANCE LETTER (OUTPATIENT)
Age: 32
End: 2022-10-09

## 2023-02-18 ENCOUNTER — HEALTH MAINTENANCE LETTER (OUTPATIENT)
Age: 33
End: 2023-02-18

## 2024-03-16 ENCOUNTER — HEALTH MAINTENANCE LETTER (OUTPATIENT)
Age: 34
End: 2024-03-16

## 2025-03-03 NOTE — PROGRESS NOTES
SUBJECTIVE:   Maryann Villarreal is a 28 year old female who presents to clinic today for the following health issues:    Patient was cutting up a deer antler with a brush crosscut saw and caught the end of her right thumb with a blade during an aggressive cutting move.  She has about a 1 cm laceration to the tip of the right thumb.  No bleeding is noted.  This laceration seems to have more than 1 fascial plane.  It appears to be quite clean after we have her soak it.  Minimal bleeding at this point in time.    HPI    Problem list and histories reviewed & adjusted, as indicated.  Additional history: as documented    Patient Active Problem List   Diagnosis     Other acne     Allergic rhinitis due to other allergen     Family history of leukemia     Pain in limb     Sprain and strain of other specified sites of knee and leg     Restless legs syndrome (RLS)     GERD (gastroesophageal reflux disease)     CARDIOVASCULAR SCREENING; LDL GOAL LESS THAN 130     Major depressive disorder, recurrent episode, moderate (H)     Major depression in complete remission (H)     Acute bilateral low back pain with left-sided sciatica     Left shoulder pain     Patellofemoral disorder of right knee     History reviewed. No pertinent surgical history.    Social History     Tobacco Use     Smoking status: Never Smoker     Smokeless tobacco: Never Used   Substance Use Topics     Alcohol use: Yes     Comment: 3-4 times/year     Family History   Problem Relation Age of Onset     Breast Cancer Mother      Breast Cancer Maternal Grandmother          Current Outpatient Medications   Medication Sig Dispense Refill     cephALEXin (KEFLEX) 500 MG capsule Take 1 capsule (500 mg) by mouth 2 times daily for 10 days 20 capsule 0     IBUPROFEN 200 MG OR CAPS  (Patient not taking: Reported on 1/14/2019)  0     Phenylephrine-DM-GG-APAP (COLD & FLU SEVERE PO)        TYLENOL 325 MG OR TABS  (Patient not taking: Reported on 10/19/2018)  0     Allergies    Allergen Reactions     No Known Drug Allergies      BP Readings from Last 3 Encounters:   01/14/19 118/70   10/19/18 110/74   10/19/18 126/80    Wt Readings from Last 3 Encounters:   10/19/18 89.6 kg (197 lb 9.6 oz)   10/19/18 89.6 kg (197 lb 9.6 oz)   08/03/18 89.8 kg (198 lb)                    ROS:  Constitutional, HEENT, cardiovascular, pulmonary, GI, , musculoskeletal, neuro, skin, endocrine and psych systems are negative, except as otherwise noted.    OBJECTIVE:     /70   Pulse 72   Temp 99.6  F (37.6  C) (Temporal)   Resp 16   There is no height or weight on file to calculate BMI.  GENERAL: healthy, alert and no distress  MS: no gross musculoskeletal defects noted, no edema  SKIN: no suspicious lesions or rashes 1 cm in rough though she does have a laceration is approximately length it is crescent shaped with interruption in the mid aspect of the laceration to the distal tip of the right thumb.  We clean this area up carefully and have her soak it for a few minutes while we arrange to make closure.  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  She is fortunately left-hand dominant.  NEURO: Normal strength and tone, mentation intact and speech normal  PSYCH: mentation appears normal, affect normal/bright    Diagnostic Test Results:  No results found for this or any previous visit (from the past 24 hour(s)).    Last tetanus booster within 10 years: yes    Size of laceration: 1 centimeters  Characteristics of the laceration: bleeding- mild  Tendon function intact: not applicable  Sensation to light touch intact: yes  Pulses intact: yes    Picture included in patient's chart: no    Assessment:  1 centimeter laceration    PLAN:  Wound was locally injected with 2 cc's of Lidocaine 1% plain  Prepped and draped in the usual sterile fashion  Wound cleaned with HIBICLENS  Laceration was closed using 2 5-0 sutures interrupted sutures  After care instructions:  Keep  wound clean and dry for the next 24-48 hours  Sutures out in 7 days  Signs of infection discussed today  May return to work as long as wound is kept clean and dry  Discussed the probability of scarring  Active range of motion exercises encouraged    ASSESSMENT/PLAN:     1. Thumb laceration, right, initial encounter  - cephALEXin (KEFLEX) 500 MG capsule; Take 1 capsule (500 mg) by mouth 2 times daily for 10 days  Dispense: 20 capsule; Refill: 0    ROV PRN for suture removal in 7 days.    Nazario Evans PA-C  [facility]   Pediatric Neurology  Pediatric Neurology  [address] [facility] [address] [place] [address]  Phone: [phone]  Fax: [phone]  Follow Up Time: 1-3 Days